# Patient Record
Sex: FEMALE | Race: BLACK OR AFRICAN AMERICAN | NOT HISPANIC OR LATINO | Employment: STUDENT | URBAN - METROPOLITAN AREA
[De-identification: names, ages, dates, MRNs, and addresses within clinical notes are randomized per-mention and may not be internally consistent; named-entity substitution may affect disease eponyms.]

---

## 2017-07-20 ENCOUNTER — LAB VISIT (OUTPATIENT)
Dept: LAB | Facility: OTHER | Age: 22
End: 2017-07-20
Attending: OBSTETRICS & GYNECOLOGY
Payer: MEDICAID

## 2017-07-20 ENCOUNTER — OFFICE VISIT (OUTPATIENT)
Dept: OBSTETRICS AND GYNECOLOGY | Facility: CLINIC | Age: 22
End: 2017-07-20
Attending: OBSTETRICS & GYNECOLOGY
Payer: MEDICAID

## 2017-07-20 VITALS
SYSTOLIC BLOOD PRESSURE: 114 MMHG | DIASTOLIC BLOOD PRESSURE: 66 MMHG | WEIGHT: 110.25 LBS | HEIGHT: 59 IN | BODY MASS INDEX: 22.23 KG/M2

## 2017-07-20 DIAGNOSIS — Z11.3 SCREEN FOR SEXUALLY TRANSMITTED DISEASES: ICD-10-CM

## 2017-07-20 DIAGNOSIS — Z23 NEED FOR HPV VACCINATION: Primary | ICD-10-CM

## 2017-07-20 DIAGNOSIS — Z01.419 ENCOUNTER FOR GYNECOLOGICAL EXAMINATION: Primary | ICD-10-CM

## 2017-07-20 DIAGNOSIS — Z23 NEED FOR HPV VACCINATION: ICD-10-CM

## 2017-07-20 DIAGNOSIS — L70.9 ACNE, UNSPECIFIED ACNE TYPE: ICD-10-CM

## 2017-07-20 LAB
C TRACH DNA SPEC QL NAA+PROBE: NOT DETECTED
CANDIDA RRNA VAG QL PROBE: NEGATIVE
G VAGINALIS RRNA GENITAL QL PROBE: POSITIVE
N GONORRHOEA DNA SPEC QL NAA+PROBE: NOT DETECTED
T VAGINALIS RRNA GENITAL QL PROBE: NEGATIVE

## 2017-07-20 PROCEDURE — 99385 PREV VISIT NEW AGE 18-39: CPT | Mod: S$PBB,,, | Performed by: OBSTETRICS & GYNECOLOGY

## 2017-07-20 PROCEDURE — 99999 PR PBB SHADOW E&M-EST. PATIENT-LVL I: CPT | Mod: PBBFAC,,,

## 2017-07-20 PROCEDURE — 86703 HIV-1/HIV-2 1 RESULT ANTBDY: CPT

## 2017-07-20 PROCEDURE — 36415 COLL VENOUS BLD VENIPUNCTURE: CPT

## 2017-07-20 PROCEDURE — 87340 HEPATITIS B SURFACE AG IA: CPT

## 2017-07-20 PROCEDURE — 99999 PR PBB SHADOW E&M-EST. PATIENT-LVL III: CPT | Mod: PBBFAC,,, | Performed by: OBSTETRICS & GYNECOLOGY

## 2017-07-20 PROCEDURE — 86592 SYPHILIS TEST NON-TREP QUAL: CPT

## 2017-07-20 RX ORDER — DROSPIRENONE AND ETHINYL ESTRADIOL 0.02-3(28)
1 KIT ORAL DAILY
Qty: 28 TABLET | Refills: 12 | Status: SHIPPED | OUTPATIENT
Start: 2017-07-20 | End: 2017-07-24 | Stop reason: SDUPTHER

## 2017-07-20 RX ORDER — DROSPIRENONE AND ETHINYL ESTRADIOL 0.02-3(28)
1 KIT ORAL DAILY
Qty: 28 TABLET | Refills: 12 | Status: SHIPPED | OUTPATIENT
Start: 2017-07-20 | End: 2017-07-20

## 2017-07-20 NOTE — PROGRESS NOTES
Subjective:       Patient ID: Paz Bentley is a 22 y.o. female.    Chief Complaint:  Establish Care (pt c/o spotting after her cycle.)      History of Present Illness.  Paz Bentley is a 22 y.o. female.  She has no breast or urinary symptoms.  She has no menorrhagia, oligomenorrhea, bleeding betweeen menses, postcoital bleeding, pelvic pain, dyspareunia, vaginal dryness, vaginal discharge, or sexual complaints.  Her periods usually come monthly and last 4-5 days. The flow is normally light to medium.  Her cramps are 6/10 first 2 days of bleeding and it is relieved by ibuprofen.  She was last sexually active in 2016.  She usually uses condoms.  She complains of bleeding 3 days after her last period.  She also complains of acne and wants to try OCP to help it.    GYN & OB History  Patient's last menstrual period was 07/15/2017 (approximate).   Gardasil: No    OB History    Para Term  AB Living   0 0 0 0 0 0   SAB TAB Ectopic Multiple Live Births   0 0 0 0 0             History reviewed. No pertinent past medical history.  History reviewed. No pertinent surgical history.  Family History   Problem Relation Age of Onset    Breast cancer Maternal Grandmother     Colon cancer Neg Hx     Ovarian cancer Neg Hx      Social History   Substance Use Topics    Smoking status: Never Smoker    Smokeless tobacco: Never Used    Alcohol use No       Current Outpatient Prescriptions:     drospirenone-ethinyl estradiol (JOSE) 3-0.02 mg per tablet, Take 1 tablet by mouth once daily., Disp: 28 tablet, Rfl: 12    Review of patient's allergies indicates:  No Known Allergies    Review of Systems  Review of Systems   Constitutional: Negative for fatigue.   HENT: Negative for trouble swallowing.    Eyes: Negative for visual disturbance.   Respiratory: Negative for cough and shortness of breath.    Cardiovascular: Negative for chest pain.   Gastrointestinal: Negative for abdominal distention, abdominal pain, blood  "in stool, nausea and vomiting.   Genitourinary: Negative for difficulty urinating, dyspareunia, dysuria, flank pain, frequency, hematuria, pelvic pain, urgency, vaginal bleeding, vaginal discharge and vaginal pain.   Musculoskeletal: Negative for arthralgias.   Skin: Negative for rash.   Neurological: Negative for dizziness and headaches.   Psychiatric/Behavioral: Negative for sleep disturbance. The patient is not nervous/anxious.         Objective:     Vitals:    07/20/17 1420   BP: 114/66   Weight: 50 kg (110 lb 3.7 oz)   Height: 4' 11" (1.499 m)   PainSc: 0-No pain     Body mass index is 22.26 kg/m².    Physical Exam:   Constitutional: She is oriented to person, place, and time. Vital signs are normal. She appears well-developed and well-nourished.    HENT:   Head: Normocephalic.     Neck: Normal range of motion. No thyromegaly present.     Pulmonary/Chest: Right breast exhibits no mass, no nipple discharge, no skin change, no tenderness and no swelling. Left breast exhibits no mass, no nipple discharge, no skin change, no tenderness and no swelling. Breasts are symmetrical.        Abdominal: Soft. Normal appearance and bowel sounds are normal. She exhibits no distension. There is no tenderness.     Genitourinary: Vagina normal and uterus normal. Pelvic exam was performed with patient supine. There is no rash, tenderness, lesion or injury on the right labia. There is no rash, tenderness, lesion or injury on the left labia. Cervix is normal. Right adnexum displays no mass, no tenderness and no fullness. Left adnexum displays no mass, no tenderness and no fullness. No erythema in the vagina. No vaginal discharge found. Cervix exhibits no motion tenderness and no discharge.           Musculoskeletal: Normal range of motion.      Lymphadenopathy:        Right: No inguinal and no supraclavicular adenopathy present.        Left: No inguinal and no supraclavicular adenopathy present.    Neurological: She is alert and " oriented to person, place, and time.    Skin: Skin is warm and dry.    Psychiatric: She has a normal mood and affect.        Assessment/ Plan:     Encounter for gynecological examination    Screen for sexually transmitted diseases  -     C. trachomatis/N. gonorrhoeae by AMP DNA Cervix  -     HIV-1 and HIV-2 antibodies; Future; Expected date: 07/20/2017  -     RPR; Future; Expected date: 07/20/2017  -     Hepatitis B surface antigen; Future; Expected date: 07/20/2017  -     Vaginosis Screen by DNA Probe    Need for HPV vaccination  -     (In Office Administered) HPV Vaccine (9-Valent) (3 Dose) (IM)    Acne, unspecified acne type  -     Discontinue: drospirenone-ethinyl estradiol (JOSE) 3-0.02 mg per tablet; Take 1 tablet by mouth once daily.  Dispense: 28 tablet; Refill: 12    Other orders  -     drospirenone-ethinyl estradiol (JOSE) 3-0.02 mg per tablet; Take 1 tablet by mouth once daily.  Dispense: 28 tablet; Refill: 12        Contraception and STD testing discussed. Patient will do STD testing.  Gardasil discussed.  Follow-up with me in 4 months.

## 2017-07-20 NOTE — PROGRESS NOTES
Educational flyer reviewed the HPV vaccination reviewed and provided to pt. Pt received HPV injection as ordered by Dr Ramirez to left deltoid, pt toelrated well. Pt denies pain. Pt instructed and observed for 15 min post injection. No reaction noted.

## 2017-07-21 LAB
HBV SURFACE AG SERPL QL IA: NEGATIVE
HIV 1+2 AB+HIV1 P24 AG SERPL QL IA: NEGATIVE
RPR SER QL: NORMAL

## 2017-07-22 DIAGNOSIS — B96.89 BV (BACTERIAL VAGINOSIS): Primary | ICD-10-CM

## 2017-07-22 DIAGNOSIS — N76.0 BV (BACTERIAL VAGINOSIS): Primary | ICD-10-CM

## 2017-07-22 RX ORDER — METRONIDAZOLE 500 MG/1
500 TABLET ORAL 2 TIMES DAILY
Qty: 14 TABLET | Refills: 0 | Status: SHIPPED | OUTPATIENT
Start: 2017-07-22 | End: 2017-07-24 | Stop reason: SDUPTHER

## 2017-07-24 ENCOUNTER — TELEPHONE (OUTPATIENT)
Dept: OBSTETRICS AND GYNECOLOGY | Facility: CLINIC | Age: 22
End: 2017-07-24

## 2017-07-24 DIAGNOSIS — N76.0 BV (BACTERIAL VAGINOSIS): ICD-10-CM

## 2017-07-24 DIAGNOSIS — B96.89 BV (BACTERIAL VAGINOSIS): ICD-10-CM

## 2017-07-24 RX ORDER — METRONIDAZOLE 500 MG/1
500 TABLET ORAL 2 TIMES DAILY
Qty: 14 TABLET | Refills: 0 | Status: SHIPPED | OUTPATIENT
Start: 2017-07-24 | End: 2018-08-21 | Stop reason: SDUPTHER

## 2017-07-24 RX ORDER — DROSPIRENONE AND ETHINYL ESTRADIOL 0.02-3(28)
1 KIT ORAL DAILY
Qty: 28 TABLET | Refills: 11 | Status: SHIPPED | OUTPATIENT
Start: 2017-07-24 | End: 2021-09-20

## 2017-07-24 NOTE — TELEPHONE ENCOUNTER
----- Message from Lucia Ramirez MD sent at 7/22/2017  9:58 AM CDT -----  Call pt.  STD labs normal

## 2017-07-24 NOTE — TELEPHONE ENCOUNTER
----- Message from Lucia Ramirez MD sent at 7/22/2017 10:33 AM CDT -----  Call patient.  Her vaginal swab is positive for bacterial vaginosis. This is not a sexually transmitted disease.  She doesn't have pharmacy, so please pend flagyl for me to sign.

## 2017-07-24 NOTE — TELEPHONE ENCOUNTER
Informed pt of results. Pt verbalized understanding and is requesting Rx to be sent to [facility] on [address] and [address]. Pharmacy and allergies are UTD please review and sign

## 2017-10-19 ENCOUNTER — CLINICAL SUPPORT (OUTPATIENT)
Dept: OBSTETRICS AND GYNECOLOGY | Facility: CLINIC | Age: 22
End: 2017-10-19
Payer: MEDICAID

## 2017-10-19 DIAGNOSIS — Z23 NEED FOR HPV VACCINE: Primary | ICD-10-CM

## 2017-10-19 PROCEDURE — 99999 PR PBB SHADOW E&M-EST. PATIENT-LVL I: CPT | Mod: PBBFAC,,,

## 2017-10-19 PROCEDURE — 99211 OFF/OP EST MAY X REQ PHY/QHP: CPT | Mod: PBBFAC,PN

## 2017-10-19 NOTE — PROGRESS NOTES
Ordering Physician: Dr. Ramirez    Order Type: Verbal     During visit today patient received injection of Gardasil to right deltoid. Patient tolerated well, no allergic reaction noted. Requested patient to remain 10 minutes after injection.     Pre-Pain Scale:None     Post Pain Scale:None

## 2018-06-11 ENCOUNTER — TELEPHONE (OUTPATIENT)
Dept: OBSTETRICS AND GYNECOLOGY | Facility: CLINIC | Age: 23
End: 2018-06-11

## 2018-06-11 NOTE — TELEPHONE ENCOUNTER
Swing pt - pt went to [facility] about 2 weeks ago for an ingrown hair in her vaginal area. She was prescribed an antibiotic and a cream but she lost the antibiotic so she couldn't finish taking it and the ingrown hair is back. Pt would like to know if she should see someone when she comes in on Thursday for her 3rd gardasil shot or if she can get an rx sent to the pharm for her.

## 2018-06-14 ENCOUNTER — CLINICAL SUPPORT (OUTPATIENT)
Dept: OBSTETRICS AND GYNECOLOGY | Facility: CLINIC | Age: 23
End: 2018-06-14
Payer: MEDICAID

## 2018-06-14 ENCOUNTER — OFFICE VISIT (OUTPATIENT)
Dept: OBSTETRICS AND GYNECOLOGY | Facility: CLINIC | Age: 23
End: 2018-06-14
Attending: STUDENT IN AN ORGANIZED HEALTH CARE EDUCATION/TRAINING PROGRAM
Payer: MEDICAID

## 2018-06-14 VITALS
HEIGHT: 59 IN | BODY MASS INDEX: 21.65 KG/M2 | DIASTOLIC BLOOD PRESSURE: 62 MMHG | WEIGHT: 107.38 LBS | SYSTOLIC BLOOD PRESSURE: 98 MMHG

## 2018-06-14 DIAGNOSIS — L73.9 FOLLICULITIS: Primary | ICD-10-CM

## 2018-06-14 DIAGNOSIS — Z23 NEED FOR HPV VACCINE: Primary | ICD-10-CM

## 2018-06-14 PROCEDURE — 90471 IMMUNIZATION ADMIN: CPT | Mod: PBBFAC

## 2018-06-14 PROCEDURE — 99999 PR PBB SHADOW E&M-EST. PATIENT-LVL I: CPT | Mod: PBBFAC,,,

## 2018-06-14 PROCEDURE — 99213 OFFICE O/P EST LOW 20 MIN: CPT | Mod: PBBFAC,27,25 | Performed by: STUDENT IN AN ORGANIZED HEALTH CARE EDUCATION/TRAINING PROGRAM

## 2018-06-14 PROCEDURE — 99211 OFF/OP EST MAY X REQ PHY/QHP: CPT | Mod: PBBFAC

## 2018-06-14 PROCEDURE — 99999 PR PBB SHADOW E&M-EST. PATIENT-LVL III: CPT | Mod: PBBFAC,,, | Performed by: STUDENT IN AN ORGANIZED HEALTH CARE EDUCATION/TRAINING PROGRAM

## 2018-06-14 PROCEDURE — 99213 OFFICE O/P EST LOW 20 MIN: CPT | Mod: S$PBB,,, | Performed by: STUDENT IN AN ORGANIZED HEALTH CARE EDUCATION/TRAINING PROGRAM

## 2018-06-14 NOTE — PROGRESS NOTES
"Chief Complaint: Ingrown hair     HPI:      Paz Bentley is a 23 y.o.  who presents today for Gardasil shot and evaluation of ingrown hair.  She reports that this is the 2nd time this has happened.  Reports it is like a pimple and will drain and then return.  She reports shaving in the area as well.  Denies any current drainage.  No fevers or chills.  Denies any vaginal discharge.      OB History      Para Term  AB Living    0 0 0 0 0 0    SAB TAB Ectopic Multiple Live Births    0 0 0 0 0        History reviewed. No pertinent past medical history.  History reviewed. No pertinent surgical history.  Social History     Social History    Marital status: Single     Spouse name: N/A    Number of children: N/A    Years of education: N/A     Occupational History    Not on file.     Social History Main Topics    Smoking status: Never Smoker    Smokeless tobacco: Never Used    Alcohol use No    Drug use: No    Sexual activity: Not Currently     Other Topics Concern    Not on file     Social History Narrative    No narrative on file     Family History   Problem Relation Age of Onset    Breast cancer Maternal Grandmother     Colon cancer Neg Hx     Ovarian cancer Neg Hx        ROS:     GENERAL: Denies unintentional weight gain or weight loss. Feeling well overall.   SKIN: Denies rash or lesions.   HEENT: Denies headaches, or vision changes.   CARDIOVASCULAR: Denies palpitations or chest pain.   RESPIRATORY: Denies shortness of breath or dyspnea on exertion.  BREASTS: Denies pain, lumps, or nipple discharge.   ABDOMEN: Denies abdominal pain, constipation, diarrhea, nausea, vomiting, change in appetite.  URINARY: Denies frequency, dysuria, hematuria.  NEUROLOGIC: Denies syncope or weakness.   PSYCHIATRIC: Denies depression, anxiety or mood swings.    Physical Exam:      PHYSICAL EXAM:  BP 98/62   Ht 4' 11" (1.499 m)   Wt 48.7 kg (107 lb 5.8 oz)   LMP 2018 (Approximate)   BMI 21.68 " kg/m²   Body mass index is 21.68 kg/m².     APPEARANCE: Well nourished, well developed, in no acute distress.  PSYCH: Appropriate mood and affect.  SKIN: No acne or hirsutism.  ABDOMEN: Soft.  No tenderness or masses.    PELVIC: Normal external genitalia without lesions.  Normal hair distribution.  4 x 4 mm area of folliculitis on L labia - healing well.  Adequate perineal body, normal urethral meatus.  Vagina moist and well rugated without lesions or discharge.  Cervix pink, without lesions, discharge or tenderness.  No significant cystocele or rectocele.  Bimanual exam shows uterus to be normal size, regular, mobile and nontender.  Adnexa without masses or tenderness.    EXTREMITIES: No edema.  No tenderness to palpation.    Assessment/Plan:     23 y.o.     Folliculitis          Counselin.  Folliculitis:  Well healed.  Counseled on perineal care and avoidance of triggers.  All questions answered.  Patient to call if symptoms worsen or do not improve.    2.  Follow up with PCP for other health maintenance.  3.  RTC in 1 month for well woman exam or sooner if needed.

## 2018-06-14 NOTE — PROGRESS NOTES
Ordering Physician: Dr. Tello    Verbal Order    Patient here to receive 3rd gardasil injection to left deltoid. Tolerated well, no reaction noted. Instructed to wait 15 minutes after administration.    Pre pain scale: none    Post pain scale: none

## 2018-08-21 ENCOUNTER — TELEPHONE (OUTPATIENT)
Dept: OBSTETRICS AND GYNECOLOGY | Facility: CLINIC | Age: 23
End: 2018-08-21

## 2018-08-21 DIAGNOSIS — N76.0 BV (BACTERIAL VAGINOSIS): ICD-10-CM

## 2018-08-21 DIAGNOSIS — B96.89 BV (BACTERIAL VAGINOSIS): ICD-10-CM

## 2018-08-21 RX ORDER — METRONIDAZOLE 500 MG/1
500 TABLET ORAL 2 TIMES DAILY
Qty: 14 TABLET | Refills: 0 | Status: SHIPPED | OUTPATIENT
Start: 2018-08-21 | End: 2019-06-07 | Stop reason: SDUPTHER

## 2018-08-21 NOTE — TELEPHONE ENCOUNTER
Pt c/o odor.  requesting Flagyl.  Advised no alcohol while on medication and if no improvement after taking medication she should be seen.      Flagyl pended

## 2018-08-21 NOTE — TELEPHONE ENCOUNTER
Swing pt - pt came in a few months ago and was diagnosed with BV and was prescribed flagyl. Pt said the symptoms are back and would like to see if she can get another rx sent into the pharm.  Richar - Raghav [phone]

## 2018-11-12 ENCOUNTER — TELEPHONE (OUTPATIENT)
Dept: OBSTETRICS AND GYNECOLOGY | Facility: CLINIC | Age: 23
End: 2018-11-12

## 2018-11-12 RX ORDER — METRONIDAZOLE 500 MG/1
500 TABLET ORAL 2 TIMES DAILY
Qty: 14 TABLET | Refills: 0 | Status: SHIPPED | OUTPATIENT
Start: 2018-11-12 | End: 2018-11-19

## 2018-11-12 NOTE — TELEPHONE ENCOUNTER
Swing pt - pt thinks she has BV and would like to see if she can get something sent into the pharm.   Richar - Raghav [phone]

## 2018-11-12 NOTE — TELEPHONE ENCOUNTER
Pt thinks she has BV.  C/o discharge and odor.  Requesting Flagyl.  She has an appt next week so if she is not better she will let you know.  Aware no alcohol while on medication    Flagyl pended

## 2018-11-20 ENCOUNTER — OFFICE VISIT (OUTPATIENT)
Dept: OBSTETRICS AND GYNECOLOGY | Facility: CLINIC | Age: 23
End: 2018-11-20
Attending: STUDENT IN AN ORGANIZED HEALTH CARE EDUCATION/TRAINING PROGRAM
Payer: MEDICAID

## 2018-11-20 VITALS — BODY MASS INDEX: 22 KG/M2 | WEIGHT: 108.94 LBS | SYSTOLIC BLOOD PRESSURE: 100 MMHG | DIASTOLIC BLOOD PRESSURE: 68 MMHG

## 2018-11-20 DIAGNOSIS — Z01.419 WELL WOMAN EXAM: ICD-10-CM

## 2018-11-20 DIAGNOSIS — N89.8 VAGINAL DISCHARGE: ICD-10-CM

## 2018-11-20 DIAGNOSIS — Z12.4 ENCOUNTER FOR PAPANICOLAOU SMEAR FOR CERVICAL CANCER SCREENING: Primary | ICD-10-CM

## 2018-11-20 LAB
CANDIDA RRNA VAG QL PROBE: NEGATIVE
G VAGINALIS RRNA GENITAL QL PROBE: NEGATIVE
T VAGINALIS RRNA GENITAL QL PROBE: NEGATIVE

## 2018-11-20 PROCEDURE — 87491 CHLMYD TRACH DNA AMP PROBE: CPT

## 2018-11-20 PROCEDURE — 88175 CYTOPATH C/V AUTO FLUID REDO: CPT

## 2018-11-20 PROCEDURE — 87660 TRICHOMONAS VAGIN DIR PROBE: CPT

## 2018-11-20 PROCEDURE — 99395 PREV VISIT EST AGE 18-39: CPT | Mod: S$PBB,,, | Performed by: STUDENT IN AN ORGANIZED HEALTH CARE EDUCATION/TRAINING PROGRAM

## 2018-11-20 PROCEDURE — 99213 OFFICE O/P EST LOW 20 MIN: CPT | Mod: PBBFAC | Performed by: STUDENT IN AN ORGANIZED HEALTH CARE EDUCATION/TRAINING PROGRAM

## 2018-11-20 PROCEDURE — 99999 PR PBB SHADOW E&M-EST. PATIENT-LVL III: CPT | Mod: PBBFAC,,, | Performed by: STUDENT IN AN ORGANIZED HEALTH CARE EDUCATION/TRAINING PROGRAM

## 2018-11-20 NOTE — PROGRESS NOTES
Chief Complaint: Well Woman Exam     HPI:      Paz Bentley is a 23 y.o.  who presents today for well woman exam.  LMP: Patient's last menstrual period was 2018.  She does report some recurrent bacterial vaginosis - just completed flagyl.  No other issues, problems, or complaints. Specifically, patient denies abnormal vaginal bleeding, discharge, pelvic pain, urinary problems, or changes in appetite. Ms. Bentley is currently sexually active with a single male partner. She is currently using condoms for contraception. She would like STD screening today.    Previous Pap: No result found     OB History      Para Term  AB Living    0 0 0 0 0 0    SAB TAB Ectopic Multiple Live Births    0 0 0 0 0        History reviewed. No pertinent past medical history.  History reviewed. No pertinent surgical history.  Social History     Socioeconomic History    Marital status: Single     Spouse name: Not on file    Number of children: Not on file    Years of education: Not on file    Highest education level: Not on file   Social Needs    Financial resource strain: Not on file    Food insecurity - worry: Not on file    Food insecurity - inability: Not on file    Transportation needs - medical: Not on file    Transportation needs - non-medical: Not on file   Occupational History    Not on file   Tobacco Use    Smoking status: Never Smoker    Smokeless tobacco: Never Used   Substance and Sexual Activity    Alcohol use: No    Drug use: No    Sexual activity: Not Currently   Other Topics Concern    Not on file   Social History Narrative    Not on file     Family History   Problem Relation Age of Onset    Breast cancer Maternal Grandmother     Colon cancer Neg Hx     Ovarian cancer Neg Hx        Current Outpatient Medications:     drospirenone-ethinyl estradiol (JOSE) 3-0.02 mg per tablet, Take 1 tablet by mouth once daily., Disp: 28 tablet, Rfl: 11    metroNIDAZOLE (FLAGYL) 500 MG tablet, Take  1 tablet (500 mg total) by mouth 2 (two) times daily., Disp: 14 tablet, Rfl: 0    ROS:     GENERAL: Denies unintentional weight gain or weight loss. Feeling well overall.   SKIN: Denies rash or lesions.   HEENT: Denies headaches, or vision changes.   CARDIOVASCULAR: Denies palpitations or chest pain.   RESPIRATORY: Denies shortness of breath or dyspnea on exertion.  BREASTS: Denies pain, lumps, or nipple discharge.   ABDOMEN: Denies abdominal pain, constipation, diarrhea, nausea, vomiting, change in appetite.  URINARY: Denies frequency, dysuria, hematuria.  NEUROLOGIC: Denies syncope or weakness.   PSYCHIATRIC: Denies depression, anxiety or mood swings.    Physical Exam:      PHYSICAL EXAM:  /68   Wt 49.4 kg (108 lb 14.5 oz)   LMP 2018   BMI 22.00 kg/m²   Body mass index is 22 kg/m².     APPEARANCE: Well nourished, well developed, in no acute distress.  PSYCH: Appropriate mood and affect.  SKIN: No acne or hirsutism.  NECK: Neck symmetric without masses or thyromegaly.  NODES: No inguinal, axillary, or supraclavicular lymph node enlargement.  CHEST: Normal respiratory effort.    CARDIOVASCULAR:  Regular rate and rhythm.  LUNGS:  Clear to auscultation bilaterally.  BREASTS: Symmetrical, no skin changes or visible lesions.  No palpable masses or nipple discharge bilaterally.  ABDOMEN: Soft.  No tenderness or masses.    PELVIC: Normal external genitalia without lesions.  Normal hair distribution.  Adequate perineal body, normal urethral meatus.  Vagina moist and well rugated without lesions or discharge.  Cervix pink, without lesions, discharge or tenderness.  No significant cystocele or rectocele.  Bimanual exam shows uterus to be normal size, regular, mobile and nontender.  Adnexa without masses or tenderness.    EXTREMITIES: No edema.  No tenderness to palpation.    Assessment/Plan:     23 y.o.     Encounter for Papanicolaou smear for cervical cancer screening  -     Liquid-based pap smear,  screening  -     Vaginosis Screen by DNA Probe    Vaginal discharge  -     C. trachomatis/N. gonorrhoeae by AMP DNA          Counselin.  Annual exam performed today without difficulty.  Patient was counseled today on current ASCCP pap guidelines, the recommendation for yearly pelvic exams, healthy diet and exercise routines, breast self awareness.  Pap smear collected.  All questions answered.    2.  Contraception:  The risks of, benefits of, and alternatives of various forms of contraception were discussed at this visit. After a discussion of the R/B/A of fertility awareness, barrier contraception, hormonal pills, injections, patches, rings, hormonal and non-hormonal IUDs, and the subdermal implant, all of  questions were answered, and she has opted for condom use.  3.  Vulvar and perineal care discussed:  Vaginal swab collected.    4.  STD testing:  GC/CT collected.  Declines blood work.  5.  Follow up with PCP for other health maintenance.  6.  RTC in 1 year or sooner if needed.    Use of the [facility] Patient Portal discussed and encouraged during today's visit.

## 2018-11-21 ENCOUNTER — TELEPHONE (OUTPATIENT)
Dept: OBSTETRICS AND GYNECOLOGY | Facility: CLINIC | Age: 23
End: 2018-11-21

## 2018-11-21 PROBLEM — Z01.419 WELL WOMAN EXAM: Status: ACTIVE | Noted: 2018-11-21

## 2018-11-21 LAB
C TRACH DNA SPEC QL NAA+PROBE: NOT DETECTED
N GONORRHOEA DNA SPEC QL NAA+PROBE: NOT DETECTED

## 2018-11-21 NOTE — TELEPHONE ENCOUNTER
----- Message from Maia Tello MD sent at 11/21/2018  1:34 PM CST -----  Please call patient and let her know that her vaginal swabs are negative which is great news!  We are still waiting on her pap smear but will let her know when it is back.  Thanks!

## 2018-11-30 ENCOUNTER — TELEPHONE (OUTPATIENT)
Dept: OBSTETRICS AND GYNECOLOGY | Facility: CLINIC | Age: 23
End: 2018-11-30

## 2018-11-30 NOTE — TELEPHONE ENCOUNTER
----- Message from Maia Tello MD sent at 11/29/2018  5:45 PM CST -----  Please call patient:      Your pap smear is back and everything looks completely normal. This is great news! Based on current guidelines you don't need another pap smear for 3 years. We do still recommend that you come back to clinic each year for your annual exam. This gives us a chance to make sure you're doing well and no problems have developed since your last visit.  Please let me know if you have any questions or concerns.      Thanks!!

## 2019-04-08 NOTE — TELEPHONE ENCOUNTER
Pt notified rx has been sent to pharmacy.   What Type Of Note Output Would You Prefer (Optional)?: Standard Output How Severe Is Your Skin Lesion?: mild Has Your Skin Lesion Been Treated?: not been treated Is This A New Presentation, Or A Follow-Up?: Skin Lesions

## 2019-06-07 ENCOUNTER — TELEPHONE (OUTPATIENT)
Dept: OBSTETRICS AND GYNECOLOGY | Facility: CLINIC | Age: 24
End: 2019-06-07

## 2019-06-07 DIAGNOSIS — N76.0 BV (BACTERIAL VAGINOSIS): ICD-10-CM

## 2019-06-07 DIAGNOSIS — B96.89 BV (BACTERIAL VAGINOSIS): ICD-10-CM

## 2019-06-07 RX ORDER — METRONIDAZOLE 500 MG/1
500 TABLET ORAL 2 TIMES DAILY
Qty: 14 TABLET | Refills: 0 | Status: SHIPPED | OUTPATIENT
Start: 2019-06-07 | End: 2019-10-31 | Stop reason: SDUPTHER

## 2019-06-07 NOTE — TELEPHONE ENCOUNTER
Arcadio Yost pt- pt thinks she is has BV again. Reports odor and discharge. Denies redness or irritation. Will come in if not better.     Allergies and Pharm UTD  Flagyl pended

## 2019-06-07 NOTE — TELEPHONE ENCOUNTER
Maia eTllo MD  You 13 minutes ago (4:29 PM)      Sent thanks!  Please let patient know and please remind her no EtOH while on flagyl.  Thanks!    Routing comment      Pt notified that Rx has been sent to pharm. Reminded not to drink alcohol while taking this medication and to finish every pill.

## 2019-06-07 NOTE — TELEPHONE ENCOUNTER
Christie pt - pt thinks she has BV and would like to see if she can get an rx sent into the pharm.

## 2019-07-15 ENCOUNTER — TELEPHONE (OUTPATIENT)
Dept: OBSTETRICS AND GYNECOLOGY | Facility: CLINIC | Age: 24
End: 2019-07-15

## 2019-07-15 RX ORDER — METRONIDAZOLE 500 MG/1
500 TABLET ORAL 2 TIMES DAILY
Qty: 14 TABLET | Refills: 0 | Status: SHIPPED | OUTPATIENT
Start: 2019-07-15 | End: 2019-07-22

## 2019-07-15 NOTE — TELEPHONE ENCOUNTER
Christie pt, didn't finish her medication that we recently prescribed. Pt still having vaginal discharge and odor

## 2019-07-15 NOTE — TELEPHONE ENCOUNTER
Pt only took a couple days of Flagyl but didn't finish rx.  Still having symptoms of BV.      Flagyl pended    Call got dropped several times.

## 2019-08-02 ENCOUNTER — OFFICE VISIT (OUTPATIENT)
Dept: URGENT CARE | Facility: CLINIC | Age: 24
End: 2019-08-02
Payer: MEDICAID

## 2019-08-02 VITALS
DIASTOLIC BLOOD PRESSURE: 72 MMHG | SYSTOLIC BLOOD PRESSURE: 113 MMHG | RESPIRATION RATE: 20 BRPM | TEMPERATURE: 98 F | BODY MASS INDEX: 21.77 KG/M2 | WEIGHT: 108 LBS | OXYGEN SATURATION: 100 % | HEIGHT: 59 IN | HEART RATE: 73 BPM

## 2019-08-02 DIAGNOSIS — J02.9 ACUTE PHARYNGITIS, UNSPECIFIED ETIOLOGY: Primary | ICD-10-CM

## 2019-08-02 PROCEDURE — 99203 OFFICE O/P NEW LOW 30 MIN: CPT | Mod: S$GLB,,, | Performed by: FAMILY MEDICINE

## 2019-08-02 PROCEDURE — 99203 PR OFFICE/OUTPT VISIT, NEW, LEVL III, 30-44 MIN: ICD-10-PCS | Mod: S$GLB,,, | Performed by: FAMILY MEDICINE

## 2019-08-02 RX ORDER — DEXAMETHASONE SODIUM PHOSPHATE 100 MG/10ML
5 INJECTION INTRAMUSCULAR; INTRAVENOUS
Status: COMPLETED | OUTPATIENT
Start: 2019-08-02 | End: 2019-08-02

## 2019-08-02 RX ORDER — CEFDINIR 300 MG/1
300 CAPSULE ORAL 2 TIMES DAILY
Qty: 20 CAPSULE | Refills: 0 | Status: SHIPPED | OUTPATIENT
Start: 2019-08-02 | End: 2019-08-12

## 2019-08-02 RX ADMIN — DEXAMETHASONE SODIUM PHOSPHATE 5 MG: 100 INJECTION INTRAMUSCULAR; INTRAVENOUS at 11:08

## 2019-08-02 NOTE — PATIENT INSTRUCTIONS
Please drink plenty of fluids.  Please get plenty of rest.  Please return here or go to the Emergency Department for any concerns or worsening of condition.  If you were given wait & see antibiotics, please wait 3-5 days before taking them, and only take them if your symptoms have worsened or not improved.  If you do begin taking the antibiotics, please take them to completion.  If you were prescribed antibiotics, please take them to completion.    If you were prescribed a narcotic medication, do not drive or operate heavy equipment or machinery while taking these medications.    You were given a decongestant (RESCON or POLY VENT Dm).  If your insurance does not cover it or you cannot afford it, it is ok to use the over the counter products listed below.  If you do not have Hypertension or any history of palpitations, it is ok to take over the counter Sudafed or Mucinex D or Allegra-D or Claritin-D or Zyrtec-D.  If you do take one of the above, it is ok to combine that with plain over the counter Mucinex or Allegra or Claritin or Zyrtec.  If for example you are taking Zyrtec -D, you can combine that with Mucinex, but not Mucinex-D.  If you are taking Mucinex-D, you can combine that with plain Allegra or Claritin or Zyrtec.   If you do have Hypertension or palpitations, it is safe to take Coricidin HBP for relief of sinus symptoms.    We recommend you take over the counter Flonase (Fluticasone) or another nasally inhaled steroid unless you are already taking one.      Nasal irrigation with a saline spray or Netti Pot like device per their directions is also recommended.  If not allergic, please take over the counter Tylenol (Acetaminophen)  as directed for control of pain and/or fever.  You may take Sudafed every 6 hours as directed for congestion.    You were given an injection of steroid.  This will relieve swelling and inflammation and improve respiratory symptoms.  It can raise your blood sugar if you are  diabetic.    Please follow up with your primary care doctor or specialist as needed.    Primary Doctor No  None    If you  smoke, please stop smoking.       You must understand that you have received treatment at an Urgent Care facility only, and that you may be  released before all of your medical problems are known or treated. Urgent Care facilities are not equipped to  handle life threatening emergencies. It is recommended that you seek care at an Emergency Department for  further evaluation of worsening or concerning symptoms, or possibly life threatening conditions as  discussed.    Pharyngitis: Strep (Presumed)    You have pharyngitis (sore throat). The cause is thought to be the streptococcus, or strep, bacterium. Strep throat infection can cause throat pain that is worse when swallowing, aching all over, headache, and fever. The infection may be spread by coughing, kissing, or touching others after touching your mouth or nose. Antibiotic medications are given to treat the infection.  Home care  · Rest at home. Drink plenty of fluids to avoid dehydration.  · No work or school for the first 2 days of taking the antibiotics. After this time, you will not be contagious. You can then return to work or school if you are feeling better.   · The antibiotic medication must be taken for the full 10 days, even if you feel better. This is very important to ensure the infection is treated. It is also important to prevent drug-resistant organisms from developing. If you were given an antibiotic shot, no more antibiotics are needed.  · You may use acetaminophen or ibuprofen to control pain or fever, unless another medicine was prescribed for this. If you have chronic liver or kidney disease or ever had a stomach ulcer or GI bleeding, talk with your doctor before using these medicines.  · Throat lozenges or a throat-numbing sprays can help reduce throat pain. Gargling with warm salt water can also help. Dissolve 1/2  teaspoon of salt in 1 8 ounce glass of warm water.   · Avoid salty or spicy foods, which can irritate the throat.  Follow-up care  Follow up with your healthcare provider or our staff if you are not improving over the next week.  When to seek medical advice  Call your healthcare provider right away if any of these occur:  · Fever as directed by your doctor.   · New or worsening ear pain, sinus pain, or headache  · Painful lumps in the back of neck  · Stiff neck  · Lymph nodes are getting larger  · Inability to swallow liquids, excessive drooling, or inability to open mouth wide due to throat pain  · Signs of dehydration (very dark urine or no urine, sunken eyes, dizziness)  · Trouble breathing or noisy breathing  · Muffled voice  · New rash  Date Last Reviewed: 4/13/2015  © 0962-2723 [employer]. [address] [place] [address], [place] [address]. All rights reserved. This information is not intended as a substitute for professional medical care. Always follow your healthcare professional's instructions.

## 2019-08-02 NOTE — LETTER
August 2, 2019  Paz Bentley  [address]   [place] [id number]                [facility] [place]  [address], [address]  [place] [address]  Phone: [phone]  Fax: [phone] Paz Bentley was seen and treated in our Urgent Care department   on 8/2/2019. She may return to work in 2 - 3 days.      If you have any questions or concerns, please don't hesitate to call.    Sincerely,        Nael Portillo MD

## 2019-08-02 NOTE — PROGRESS NOTES
"Subjective:       Patient ID: Paz Bentley is a 24 y.o. female.    Vitals:  height is 4' 11" (1.499 m) and weight is 49 kg (108 lb). Her tympanic temperature is 97.6 °F (36.4 °C). Her blood pressure is 113/72 and her pulse is 73. Her respiration is 20 and oxygen saturation is 100%.     Chief Complaint: Fever    Fever    This is a new problem. The current episode started in the past 7 days. The problem occurs constantly. The problem has been gradually worsening. The maximum temperature noted was 103 to 103.9 F. The temperature was taken using an oral thermometer. Associated symptoms include ear pain and headaches. Pertinent negatives include no chest pain, congestion, coughing, diarrhea, nausea, rash, sore throat, urinary pain or vomiting. She has tried acetaminophen (nyquil) for the symptoms. The treatment provided mild relief.       Constitution: Positive for appetite change, chills, sweating, fatigue and fever.   HENT: Positive for ear pain. Negative for congestion and sore throat.    Neck: Negative for painful lymph nodes.   Cardiovascular: Negative for chest pain and leg swelling.   Eyes: Negative for double vision and blurred vision.   Respiratory: Negative for cough and shortness of breath.    Gastrointestinal: Negative for nausea, vomiting and diarrhea.   Genitourinary: Negative for dysuria, frequency, urgency and history of kidney stones.   Musculoskeletal: Negative for joint pain, joint swelling, muscle cramps and muscle ache.   Skin: Negative for color change, pale, rash and bruising.   Allergic/Immunologic: Negative for seasonal allergies.   Neurological: Positive for headaches. Negative for dizziness, history of vertigo, light-headedness and passing out.   Hematologic/Lymphatic: Negative for swollen lymph nodes.   Psychiatric/Behavioral: Negative for nervous/anxious, sleep disturbance and depression. The patient is not nervous/anxious.        Objective:      Physical Exam   Constitutional: She is " oriented to person, place, and time. She appears well-developed and well-nourished. She is cooperative.  Non-toxic appearance. She does not appear ill. No distress.   HENT:   Head: Normocephalic and atraumatic.   Right Ear: Hearing, tympanic membrane, external ear and ear canal normal.   Left Ear: Hearing, tympanic membrane, external ear and ear canal normal.   Nose: Nose normal. No mucosal edema, rhinorrhea or nasal deformity. No epistaxis. Right sinus exhibits no maxillary sinus tenderness and no frontal sinus tenderness. Left sinus exhibits no maxillary sinus tenderness and no frontal sinus tenderness.   Mouth/Throat: Uvula is midline and mucous membranes are normal. No trismus in the jaw. Normal dentition. No uvula swelling. Posterior oropharyngeal edema and posterior oropharyngeal erythema present.   Eyes: Conjunctivae and lids are normal. No scleral icterus.   Neck: Trachea normal, full passive range of motion without pain and phonation normal. Neck supple.   Cardiovascular: Normal rate, regular rhythm, normal heart sounds, intact distal pulses and normal pulses.   Pulmonary/Chest: Effort normal and breath sounds normal. No respiratory distress.   Abdominal: Soft. Normal appearance and bowel sounds are normal. She exhibits no distension. There is no tenderness.   Musculoskeletal: Normal range of motion. She exhibits no edema or deformity.   Neurological: She is alert and oriented to person, place, and time. She exhibits normal muscle tone. Coordination normal.   Skin: Skin is warm, dry and intact. She is not diaphoretic. No pallor.   Psychiatric: She has a normal mood and affect. Her speech is normal and behavior is normal. Judgment and thought content normal. Cognition and memory are normal.   Nursing note and vitals reviewed.      Assessment:       1. Acute pharyngitis, unspecified etiology        Plan:         Acute pharyngitis, unspecified etiology  -     cefdinir (OMNICEF) 300 MG capsule; Take 1 capsule  (300 mg total) by mouth 2 (two) times daily. for 10 days  Dispense: 20 capsule; Refill: 0  -     pseudoephedrine-DM-guaifenesin (POLY-VENT DM) 60- mg Tab; Take 1 tablet by mouth every 6 (six) hours as needed.  Dispense: 20 tablet; Refill: 0  -     dexamethasone injection 5 mg    Please drink plenty of fluids.  Please get plenty of rest.  Please return here or go to the Emergency Department for any concerns or worsening of condition.  If you were given wait & see antibiotics, please wait 3-5 days before taking them, and only take them if your symptoms have worsened or not improved.  If you do begin taking the antibiotics, please take them to completion.  If you were prescribed antibiotics, please take them to completion.  If you were prescribed a narcotic medication, do not drive or operate heavy equipment or machinery while taking these medications.    You were given a decongestant (RESCON or POLY VENT Dm).  If your insurance does not cover it or you cannot afford it, it is ok to use the over the counter products listed below.  If you do not have Hypertension or any history of palpitations, it is ok to take over the counter Sudafed or Mucinex D or Allegra-D or Claritin-D or Zyrtec-D.  If you do take one of the above, it is ok to combine that with plain over the counter Mucinex or Allegra or Claritin or Zyrtec.  If for example you are taking Zyrtec -D, you can combine that with Mucinex, but not Mucinex-D.  If you are taking Mucinex-D, you can combine that with plain Allegra or Claritin or Zyrtec.   If you do have Hypertension or palpitations, it is safe to take Coricidin HBP for relief of sinus symptoms.    We recommend you take over the counter Flonase (Fluticasone) or another nasally inhaled steroid unless you are already taking one.  Nasal irrigation with a saline spray or Netti Pot like device per their directions is also recommended.  If not allergic, please take over the counter Tylenol (Acetaminophen)  and/or Motrin (Ibuprofen) as directed for control of pain and/or fever.    Robitussin DM 2 teas every 4 hours as needed for cough.  If you  smoke, please stop smoking.    You were given an injection of steroid.  This will relieve swelling and inflammation and improve respiratory symptoms.  It can raise your blood sugar if you are diabetic.    Please follow up with your primary care doctor or specialist as needed.  Primary Doctor No  None    You must understand that you have received treatment at an Urgent Care facility only, and that you may be  released before all of your medical problems are known or treated. Urgent Care facilities are not equipped to  handle life threatening emergencies. It is recommended that you seek care at an Emergency Department for  further evaluation of worsening or concerning symptoms, or possibly life threatening conditions as  discussed.

## 2019-08-05 ENCOUNTER — TELEPHONE (OUTPATIENT)
Dept: URGENT CARE | Facility: CLINIC | Age: 24
End: 2019-08-05

## 2019-09-12 ENCOUNTER — TELEPHONE (OUTPATIENT)
Dept: OBSTETRICS AND GYNECOLOGY | Facility: CLINIC | Age: 24
End: 2019-09-12

## 2019-09-12 RX ORDER — METRONIDAZOLE 500 MG/1
500 TABLET ORAL 2 TIMES DAILY
Qty: 14 TABLET | Refills: 0 | Status: SHIPPED | OUTPATIENT
Start: 2019-09-12 | End: 2019-09-19

## 2019-09-12 NOTE — TELEPHONE ENCOUNTER
Christie pt - pt would like to see if she can get an rx sent into the pharm for BV.  Rx - [facility] [phone]

## 2019-09-12 NOTE — TELEPHONE ENCOUNTER
Pt thinks she has BV.  C/o odor.  Requesting a rx.  Recommended an appt if no improvement after taking medication.      Flagyl pended

## 2019-10-30 DIAGNOSIS — N76.0 BV (BACTERIAL VAGINOSIS): ICD-10-CM

## 2019-10-30 DIAGNOSIS — B96.89 BV (BACTERIAL VAGINOSIS): ICD-10-CM

## 2019-10-30 RX ORDER — METRONIDAZOLE 500 MG/1
500 TABLET ORAL 2 TIMES DAILY
Qty: 14 TABLET | Refills: 0 | OUTPATIENT
Start: 2019-10-30

## 2019-10-31 ENCOUNTER — TELEPHONE (OUTPATIENT)
Dept: OBSTETRICS AND GYNECOLOGY | Facility: CLINIC | Age: 24
End: 2019-10-31

## 2019-10-31 DIAGNOSIS — N76.0 BV (BACTERIAL VAGINOSIS): ICD-10-CM

## 2019-10-31 DIAGNOSIS — B96.89 BV (BACTERIAL VAGINOSIS): ICD-10-CM

## 2019-10-31 RX ORDER — METRONIDAZOLE 500 MG/1
500 TABLET ORAL 2 TIMES DAILY
Qty: 14 TABLET | Refills: 0 | Status: SHIPPED | OUTPATIENT
Start: 2019-10-31 | End: 2020-06-01 | Stop reason: SDUPTHER

## 2019-10-31 NOTE — TELEPHONE ENCOUNTER
Christie pt, called and spoke to someone yesterday requesting refill of medication for b.v. But the request was denied. Pt having odor and discharge.

## 2019-10-31 NOTE — TELEPHONE ENCOUNTER
Pt thinks she has BV and would like PO medication.  Aware of alcohol restrictions.  Recommended an appt if no improvement after taking medication.

## 2019-11-26 ENCOUNTER — LAB VISIT (OUTPATIENT)
Dept: LAB | Facility: OTHER | Age: 24
End: 2019-11-26
Attending: STUDENT IN AN ORGANIZED HEALTH CARE EDUCATION/TRAINING PROGRAM
Payer: MEDICAID

## 2019-11-26 ENCOUNTER — OFFICE VISIT (OUTPATIENT)
Dept: OBSTETRICS AND GYNECOLOGY | Facility: CLINIC | Age: 24
End: 2019-11-26
Attending: STUDENT IN AN ORGANIZED HEALTH CARE EDUCATION/TRAINING PROGRAM
Payer: MEDICAID

## 2019-11-26 VITALS — WEIGHT: 116.88 LBS | BODY MASS INDEX: 23.56 KG/M2 | HEIGHT: 59 IN

## 2019-11-26 DIAGNOSIS — Z11.3 SCREENING EXAMINATION FOR STD (SEXUALLY TRANSMITTED DISEASE): Primary | ICD-10-CM

## 2019-11-26 DIAGNOSIS — Z11.3 SCREENING EXAMINATION FOR STD (SEXUALLY TRANSMITTED DISEASE): ICD-10-CM

## 2019-11-26 DIAGNOSIS — Z01.419 WELL WOMAN EXAM: ICD-10-CM

## 2019-11-26 LAB
C TRACH DNA SPEC QL NAA+PROBE: NOT DETECTED
N GONORRHOEA DNA SPEC QL NAA+PROBE: NOT DETECTED

## 2019-11-26 PROCEDURE — 99213 OFFICE O/P EST LOW 20 MIN: CPT | Mod: PBBFAC | Performed by: STUDENT IN AN ORGANIZED HEALTH CARE EDUCATION/TRAINING PROGRAM

## 2019-11-26 PROCEDURE — 99395 PR PREVENTIVE VISIT,EST,18-39: ICD-10-PCS | Mod: S$PBB,,, | Performed by: STUDENT IN AN ORGANIZED HEALTH CARE EDUCATION/TRAINING PROGRAM

## 2019-11-26 PROCEDURE — 99999 PR PBB SHADOW E&M-EST. PATIENT-LVL III: ICD-10-PCS | Mod: PBBFAC,,, | Performed by: STUDENT IN AN ORGANIZED HEALTH CARE EDUCATION/TRAINING PROGRAM

## 2019-11-26 PROCEDURE — 36415 COLL VENOUS BLD VENIPUNCTURE: CPT

## 2019-11-26 PROCEDURE — 87491 CHLMYD TRACH DNA AMP PROBE: CPT

## 2019-11-26 PROCEDURE — 99395 PREV VISIT EST AGE 18-39: CPT | Mod: S$PBB,,, | Performed by: STUDENT IN AN ORGANIZED HEALTH CARE EDUCATION/TRAINING PROGRAM

## 2019-11-26 PROCEDURE — 86592 SYPHILIS TEST NON-TREP QUAL: CPT

## 2019-11-26 PROCEDURE — 87340 HEPATITIS B SURFACE AG IA: CPT

## 2019-11-26 PROCEDURE — 86703 HIV-1/HIV-2 1 RESULT ANTBDY: CPT

## 2019-11-26 PROCEDURE — 99999 PR PBB SHADOW E&M-EST. PATIENT-LVL III: CPT | Mod: PBBFAC,,, | Performed by: STUDENT IN AN ORGANIZED HEALTH CARE EDUCATION/TRAINING PROGRAM

## 2019-11-27 NOTE — PROGRESS NOTES
Chief Complaint: Well Woman Exam     HPI:      Paz Bentley is a 24 y.o.  who presents today for well woman exam.  LMP: Patient's last menstrual period was 2019 (exact date).  No issues, problems, or complaints. Specifically, patient denies abnormal vaginal bleeding, discharge, pelvic pain, urinary problems, or changes in appetite. Ms. Bentley is currently sexually active with a single male partner. She is currently using no method for contraception. She declines STD screening today.    Previous Pap: 2018 no abnormalities    OB History        0    Para   0    Term   0       0    AB   0    Living   0       SAB   0    TAB   0    Ectopic   0    Multiple   0    Live Births   0           Obstetric Comments   Menarche at 14           History reviewed. No pertinent past medical history.  History reviewed. No pertinent surgical history.  Social History     Socioeconomic History    Marital status: Single     Spouse name: Not on file    Number of children: Not on file    Years of education: Not on file    Highest education level: Not on file   Occupational History    Not on file   Social Needs    Financial resource strain: Not on file    Food insecurity:     Worry: Not on file     Inability: Not on file    Transportation needs:     Medical: Not on file     Non-medical: Not on file   Tobacco Use    Smoking status: Never Smoker    Smokeless tobacco: Never Used   Substance and Sexual Activity    Alcohol use: Yes     Comment: rare    Drug use: No    Sexual activity: Yes     Partners: Male     Birth control/protection: None   Lifestyle    Physical activity:     Days per week: Not on file     Minutes per session: Not on file    Stress: Not on file   Relationships    Social connections:     Talks on phone: Not on file     Gets together: Not on file     Attends Baptism service: Not on file     Active member of club or organization: Not on file     Attends meetings of clubs or  "organizations: Not on file     Relationship status: Not on file   Other Topics Concern    Not on file   Social History Narrative    Not on file     Family History   Problem Relation Age of Onset    Breast cancer Maternal Grandmother     Colon cancer Neg Hx     Ovarian cancer Neg Hx        Current Outpatient Medications:     drospirenone-ethinyl estradiol (JOSE) 3-0.02 mg per tablet, Take 1 tablet by mouth once daily., Disp: 28 tablet, Rfl: 11    metroNIDAZOLE (FLAGYL) 500 MG tablet, Take 1 tablet (500 mg total) by mouth 2 (two) times daily., Disp: 14 tablet, Rfl: 0    pseudoephedrine-DM-guaifenesin (POLY-VENT DM) 60- mg Tab, Take 1 tablet by mouth every 6 (six) hours as needed., Disp: 20 tablet, Rfl: 0    ROS:     GENERAL: Denies unintentional weight gain or weight loss. Feeling well overall.   SKIN: Denies rash or lesions.   HEENT: Denies headaches, or vision changes.   CARDIOVASCULAR: Denies palpitations or chest pain.   RESPIRATORY: Denies shortness of breath or dyspnea on exertion.  BREASTS: Denies pain, lumps, or nipple discharge.   ABDOMEN: Denies abdominal pain, constipation, diarrhea, nausea, vomiting, change in appetite.  URINARY: Denies frequency, dysuria, hematuria.  NEUROLOGIC: Denies syncope or weakness.   PSYCHIATRIC: Denies depression, anxiety or mood swings.    Physical Exam:      PHYSICAL EXAM:  Ht 4' 11" (1.499 m)   Wt 53 kg (116 lb 13.5 oz)   LMP 11/09/2019 (Exact Date)   BMI 23.60 kg/m²   Body mass index is 23.6 kg/m².     APPEARANCE: Well nourished, well developed, in no acute distress.  PSYCH: Appropriate mood and affect.  SKIN: No acne or hirsutism.  NECK: Neck symmetric without masses or thyromegaly.  NODES: No inguinal, axillary, or supraclavicular lymph node enlargement.  CHEST: Normal respiratory effort.    CARDIOVASCULAR:  Regular rate and rhythm.  LUNGS:  Clear to auscultation bilaterally.  BREASTS: Symmetrical, no skin changes or visible lesions.  No palpable masses or " nipple discharge bilaterally.  ABDOMEN: Soft.  No tenderness or masses.    PELVIC: Normal external genitalia without lesions.  Normal hair distribution.  Adequate perineal body, normal urethral meatus.  Vagina moist and well rugated without lesions or discharge.  Cervix pink, without lesions, discharge or tenderness.  No significant cystocele or rectocele.  Bimanual exam shows uterus to be normal size, regular, mobile and nontender.  Adnexa without masses or tenderness.    EXTREMITIES: No edema.  No tenderness to palpation.    Labs:  upt neg    Assessment/Plan:     24 y.o.     Screening examination for STD (sexually transmitted disease)  -     HIV 1/2 Ag/Ab (4th Gen); Future; Expected date: 2019  -     Hepatitis B surface antigen; Future; Expected date: 2019  -     C. trachomatis/N. gonorrhoeae by AMP DNA  -     RPR; Future; Expected date: 2019    Well woman exam          Counselin.  Annual exam performed today without difficulty.  Patient was counseled today on current ASCCP pap guidelines, the recommendation for yearly pelvic exams, healthy diet and exercise routines, breast self awareness.  Pap smear up to date.  All questions answered.    2.  Contraception:  Declines.  3.  STD testing:  Ordered.  4.  Follow up with PCP for other health maintenance.  5.  RTC in 1 year or sooner if needed.    Use of the [employer] Patient Portal discussed and encouraged during today's visit.

## 2019-12-06 ENCOUNTER — TELEPHONE (OUTPATIENT)
Dept: OBSTETRICS AND GYNECOLOGY | Facility: CLINIC | Age: 24
End: 2019-12-06

## 2020-01-10 ENCOUNTER — TELEPHONE (OUTPATIENT)
Dept: OBSTETRICS AND GYNECOLOGY | Facility: CLINIC | Age: 25
End: 2020-01-10

## 2020-01-10 NOTE — TELEPHONE ENCOUNTER
Igor pt, says she has a yeast infection. Is experiencing pain with urination, burning sensation, and urinary frequency. Has been having symptoms for 2 days. Is requesting if RX can be sent to [facility] pharmacy in [facility]. Please call pt and advise, thank you.

## 2020-01-10 NOTE — TELEPHONE ENCOUNTER
Pt isn't sure if she has a yeast infection or UTI.  C/o pain when voiding but also has vaginal pain when not voiding and urinary frequency.  [facility].  ER precautions discussed.

## 2020-01-11 ENCOUNTER — OFFICE VISIT (OUTPATIENT)
Dept: OBSTETRICS AND GYNECOLOGY | Facility: CLINIC | Age: 25
End: 2020-01-11
Payer: MEDICAID

## 2020-01-11 VITALS
DIASTOLIC BLOOD PRESSURE: 76 MMHG | SYSTOLIC BLOOD PRESSURE: 116 MMHG | BODY MASS INDEX: 23.24 KG/M2 | WEIGHT: 115.31 LBS | HEIGHT: 59 IN

## 2020-01-11 DIAGNOSIS — N90.89 VULVAR LESION: Primary | ICD-10-CM

## 2020-01-11 PROCEDURE — 99214 PR OFFICE/OUTPT VISIT, EST, LEVL IV, 30-39 MIN: ICD-10-PCS | Mod: S$PBB,,, | Performed by: OBSTETRICS & GYNECOLOGY

## 2020-01-11 PROCEDURE — 99214 OFFICE O/P EST MOD 30 MIN: CPT | Mod: S$PBB,,, | Performed by: OBSTETRICS & GYNECOLOGY

## 2020-01-11 PROCEDURE — 99213 OFFICE O/P EST LOW 20 MIN: CPT | Mod: PBBFAC

## 2020-01-11 PROCEDURE — 99999 PR PBB SHADOW E&M-EST. PATIENT-LVL III: ICD-10-PCS | Mod: PBBFAC,,,

## 2020-01-11 PROCEDURE — 99999 PR PBB SHADOW E&M-EST. PATIENT-LVL III: CPT | Mod: PBBFAC,,,

## 2020-01-11 PROCEDURE — 87529 HSV DNA AMP PROBE: CPT

## 2020-01-11 RX ORDER — VALACYCLOVIR HYDROCHLORIDE 1 G/1
2000 TABLET, FILM COATED ORAL 2 TIMES DAILY
Qty: 4 TABLET | Refills: 0 | Status: SHIPPED | OUTPATIENT
Start: 2020-01-11 | End: 2020-01-12

## 2020-01-11 RX ORDER — VALACYCLOVIR HYDROCHLORIDE 1 G/1
1000 TABLET, FILM COATED ORAL 2 TIMES DAILY
Qty: 20 TABLET | Refills: 1 | Status: SHIPPED | OUTPATIENT
Start: 2020-01-11 | End: 2020-01-21

## 2020-01-11 NOTE — PROGRESS NOTES
"Paz Bentley is a 24 y.o. female  p[facility] with complaint of vulvar pain and a sore spot.  Reports painful with walking and standing.         ROS:  GENERAL: No fever, chills, fatigability or weight loss.  VULVAR: No pain,  and no itching. Reports pain at L labia and sore spot.  VAGINAL: No relaxation, no abnormal bleeding and no lesions.  ABDOMEN: No abdominal pain. Denies nausea. Denies vomiting. No diarrhea. No constipation  BREAST: Denies pain. No lumps. No discharge.  URINARY: No incontinence, no nocturia, no frequency and no dysuria.  CARDIOVASCULAR: No chest pain. No shortness of breath. No leg cramps.  NEUROLOGICAL: No headaches. No vision changes.      Review of patient's allergies indicates:  No Known Allergies    Current Outpatient Medications:     drospirenone-ethinyl estradiol (JOSE) 3-0.02 mg per tablet, Take 1 tablet by mouth once daily., Disp: 28 tablet, Rfl: 11    metroNIDAZOLE (FLAGYL) 500 MG tablet, Take 1 tablet (500 mg total) by mouth 2 (two) times daily., Disp: 14 tablet, Rfl: 0    pseudoephedrine-DM-guaifenesin (POLY-VENT DM) 60- mg Tab, Take 1 tablet by mouth every 6 (six) hours as needed., Disp: 20 tablet, Rfl: 0    valACYclovir (VALTREX) 1000 MG tablet, Take 1 tablet (1,000 mg total) by mouth 2 (two) times daily. for 10 days, Disp: 20 tablet, Rfl: 1    valACYclovir (VALTREX) 1000 MG tablet, Take 2 tablets (2,000 mg total) by mouth 2 (two) times daily. for 1 day, Disp: 4 tablet, Rfl: 0    History reviewed. No pertinent past medical history.  History reviewed. No pertinent surgical history.  Social History     Tobacco Use    Smoking status: Never Smoker    Smokeless tobacco: Never Used   Substance Use Topics    Alcohol use: Yes     Comment: rare    Drug use: No     OB History    Para Term  AB Living   0 0 0 0 0 0   SAB TAB Ectopic Multiple Live Births   0 0 0 0 0   Obstetric Comments   Menarche at 14       /76   Ht 4' 11" (1.499 m) "   Wt 52.3 kg (115 lb 4.8 oz)   LMP 01/02/2020   BMI 23.29 kg/m²     PHYSICAL EXAM:  GENERAL: Calm and appropriate affect, alert, oriented x4  SKIN: Color appropriate for race, warm and dry, clean and intact with no rashes.  RESP: Even, unlabored breathing  ABDOMEN: Soft, nontender, no masses.  :   Normal external female genitalia , small cluster herpetic lesions noted on mid L labia, culture obtained. Normal hair distribution. Adequate perineal body, normal urethral meatus.  Vagina pink and well rugated, no lesions, vaginal discharge - normal  No significant cystocele or rectocele.  Cervix pink without discharge or lesions, no cervical motion tenderness.  Uterus 4-6 weeks size, regular, mobile and nontender.  Adnexa: normal adnexa in size, nontender and no masses        ASSESSMENT / PLAN:    ICD-10-CM ICD-9-CM    1. Vulvar lesion N90.89 624.8 Herpes simplex virus culture [facility]; Other (Specify) (L [facility])      valACYclovir (VALTREX) 1000 MG tablet      valACYclovir (VALTREX) 1000 MG tablet     Vulvar lesion  -     Herpes simplex virus culture [facility]; Other (Specify) (L [facility])  -     valACYclovir (VALTREX) 1000 MG tablet; Take 1 tablet (1,000 mg total) by mouth 2 (two) times daily. for 10 days  Dispense: 20 tablet; Refill: 1  -     valACYclovir (VALTREX) 1000 MG tablet; Take 2 tablets (2,000 mg total) by mouth 2 (two) times daily. for 1 day  Dispense: 4 tablet; Refill: 0      Lengthy discussion with pt re etiology , treatment and mgmt of HSV. Rx valtrex provided with instructions and discussed follow up p culture results.    Patient was counseled today on vaginitis prevention including :  a. avoiding feminine products such as deoderant soaps, body wash, bubble bath, douches, scented toilet paper, deoderant tampons or pads, feminine wipes, chronic pad use, etc.  b. avoiding other vulvovaginal irritants such as long hot baths, humidity, tight, synthetic clothing, chlorine and sitting around in wet bathing  suits  c. wearing cotton underwear, avoiding thong underwear and no underwear to bed  d. taking showers instead of baths and use a hair dryer on cool setting afterwards to dry  e. wearing cotton to exercise and shower immediately after exercise and change clothes  f. using polyurethane condoms without spermicide if sexually active and symptoms are triggered by intercourse  g. Discussed use of vagisil, along with repHresh and probiotics    FOLLOW UP:   Pending lab results, PRN lack of improvement.

## 2020-01-13 ENCOUNTER — PATIENT MESSAGE (OUTPATIENT)
Dept: OBSTETRICS AND GYNECOLOGY | Facility: CLINIC | Age: 25
End: 2020-01-13

## 2020-01-14 ENCOUNTER — TELEPHONE (OUTPATIENT)
Dept: OBSTETRICS AND GYNECOLOGY | Facility: CLINIC | Age: 25
End: 2020-01-14

## 2020-01-14 LAB
HSV1 DNA SPEC QL NAA+PROBE: NEGATIVE
HSV2 DNA SPEC QL NAA+PROBE: POSITIVE
SPECIMEN SOURCE: ABNORMAL

## 2020-01-14 NOTE — TELEPHONE ENCOUNTER
Spoke to pt ands advised that HSV culture has not yet resulted, advised to continue on the medication as prescribed and that I will notify her as soon as results are available.

## 2020-01-14 NOTE — TELEPHONE ENCOUNTER
----- Message from Maddy Huston MA sent at 1/14/2020  3:13 PM CST -----      ----- Message -----  From: Maddy Huston MA  Sent: 1/14/2020   1:24 PM CST  To: [facility] Staff        ----- Message -----  From: Maddy Huston MA  Sent: 1/13/2020   4:32 PM CST  To: ANNE-MARIE Camargo [facility] Staff  Caller: MOLLY FREGOSO [[phone]] (Today,  2:29 PM)          Name of Who is Calling:     What is the request in detail: patient request call back in reference to question /concerns from visit on 1/11/20  Please contact to further discuss and advise       Can the clinic reply by [facility]: no     What Number to Call Back if not in [facility]:  [phone]

## 2020-01-15 ENCOUNTER — PATIENT MESSAGE (OUTPATIENT)
Dept: OBSTETRICS AND GYNECOLOGY | Facility: CLINIC | Age: 25
End: 2020-01-15

## 2020-01-27 ENCOUNTER — PATIENT MESSAGE (OUTPATIENT)
Dept: OBSTETRICS AND GYNECOLOGY | Facility: CLINIC | Age: 25
End: 2020-01-27

## 2020-01-30 ENCOUNTER — OFFICE VISIT (OUTPATIENT)
Dept: OBSTETRICS AND GYNECOLOGY | Facility: CLINIC | Age: 25
End: 2020-01-30
Attending: STUDENT IN AN ORGANIZED HEALTH CARE EDUCATION/TRAINING PROGRAM
Payer: MEDICAID

## 2020-01-30 VITALS
DIASTOLIC BLOOD PRESSURE: 76 MMHG | WEIGHT: 116.06 LBS | BODY MASS INDEX: 23.4 KG/M2 | HEIGHT: 59 IN | SYSTOLIC BLOOD PRESSURE: 112 MMHG

## 2020-01-30 DIAGNOSIS — A60.00 GENITAL HERPES SIMPLEX, UNSPECIFIED SITE: Primary | ICD-10-CM

## 2020-01-30 PROCEDURE — 99213 OFFICE O/P EST LOW 20 MIN: CPT | Mod: S$PBB,,, | Performed by: STUDENT IN AN ORGANIZED HEALTH CARE EDUCATION/TRAINING PROGRAM

## 2020-01-30 PROCEDURE — 99213 OFFICE O/P EST LOW 20 MIN: CPT | Mod: PBBFAC | Performed by: STUDENT IN AN ORGANIZED HEALTH CARE EDUCATION/TRAINING PROGRAM

## 2020-01-30 PROCEDURE — 99213 PR OFFICE/OUTPT VISIT, EST, LEVL III, 20-29 MIN: ICD-10-PCS | Mod: S$PBB,,, | Performed by: STUDENT IN AN ORGANIZED HEALTH CARE EDUCATION/TRAINING PROGRAM

## 2020-01-30 PROCEDURE — 99999 PR PBB SHADOW E&M-EST. PATIENT-LVL III: CPT | Mod: PBBFAC,,, | Performed by: STUDENT IN AN ORGANIZED HEALTH CARE EDUCATION/TRAINING PROGRAM

## 2020-01-30 PROCEDURE — 99999 PR PBB SHADOW E&M-EST. PATIENT-LVL III: ICD-10-PCS | Mod: PBBFAC,,, | Performed by: STUDENT IN AN ORGANIZED HEALTH CARE EDUCATION/TRAINING PROGRAM

## 2020-02-01 RX ORDER — VALACYCLOVIR HYDROCHLORIDE 1 G/1
1000 TABLET, FILM COATED ORAL DAILY
Qty: 30 TABLET | Refills: 11 | Status: SHIPPED | OUTPATIENT
Start: 2020-02-01 | End: 2020-05-20 | Stop reason: SDUPTHER

## 2020-02-02 NOTE — PROGRESS NOTES
Chief Complaint: Vaginal irritation follow up     HPI:      Paz Bentley is a 24 y.o.  who presents today for follow up.  Seen last month for outbreak of genital herpes.  This was her first outbreak.  Taking valtrex which has resolved symptoms.  No other issues or concerns.  No lesions.  LMP: Patient's last menstrual period was 2020 (exact date).    OB History        0    Para   0    Term   0       0    AB   0    Living   0       SAB   0    TAB   0    Ectopic   0    Multiple   0    Live Births   0           Obstetric Comments   Menarche at 14           History reviewed. No pertinent past medical history.  History reviewed. No pertinent surgical history.  Social History     Socioeconomic History    Marital status: Single     Spouse name: Not on file    Number of children: Not on file    Years of education: Not on file    Highest education level: Not on file   Occupational History    Not on file   Social Needs    Financial resource strain: Not on file    Food insecurity:     Worry: Not on file     Inability: Not on file    Transportation needs:     Medical: Not on file     Non-medical: Not on file   Tobacco Use    Smoking status: Never Smoker    Smokeless tobacco: Never Used   Substance and Sexual Activity    Alcohol use: Yes     Comment: rare    Drug use: No    Sexual activity: Yes     Partners: Male     Birth control/protection: None   Lifestyle    Physical activity:     Days per week: Not on file     Minutes per session: Not on file    Stress: Not on file   Relationships    Social connections:     Talks on phone: Not on file     Gets together: Not on file     Attends [employer] service: Not on file     Active member of club or organization: Not on file     Attends meetings of clubs or organizations: Not on file     Relationship status: Not on file   Other Topics Concern    Not on file   Social History Narrative    Not on file     Family History   Problem Relation Age of  "Onset    Breast cancer Maternal Grandmother     Colon cancer Neg Hx     Ovarian cancer Neg Hx        Current Outpatient Medications:     drospirenone-ethinyl estradiol (JOSE) 3-0.02 mg per tablet, Take 1 tablet by mouth once daily., Disp: 28 tablet, Rfl: 11    metroNIDAZOLE (FLAGYL) 500 MG tablet, Take 1 tablet (500 mg total) by mouth 2 (two) times daily., Disp: 14 tablet, Rfl: 0    pseudoephedrine-DM-guaifenesin (POLY-VENT DM) 60- mg Tab, Take 1 tablet by mouth every 6 (six) hours as needed., Disp: 20 tablet, Rfl: 0    valACYclovir (VALTREX) 1000 MG tablet, Take 1 tablet (1,000 mg total) by mouth 2 (two) times daily. for 10 days, Disp: 20 tablet, Rfl: 1    ROS:     GENERAL: Denies unintentional weight gain or weight loss. Feeling well overall.   SKIN: Denies rash or lesions.   HEENT: Denies headaches, or vision changes.   CARDIOVASCULAR: Denies palpitations or chest pain.   RESPIRATORY: Denies shortness of breath or dyspnea on exertion.  BREASTS: Denies pain, lumps, or nipple discharge.   ABDOMEN: Denies abdominal pain, constipation, diarrhea, nausea, vomiting, change in appetite.  URINARY: Denies frequency, dysuria, hematuria.  NEUROLOGIC: Denies syncope or weakness.   PSYCHIATRIC: Denies depression, anxiety or mood swings.    Physical Exam:      PHYSICAL EXAM:  /76   Ht 4' 11" (1.499 m)   Wt 52.7 kg (116 lb 1.2 oz)   LMP 2020 (Exact Date)   BMI 23.44 kg/m²   Body mass index is 23.44 kg/m².     APPEARANCE: Well nourished, well developed, in no acute distress.  PSYCH: Appropriate mood and affect.  SKIN: No acne or hirsutism.   EXTREMITIES: No edema.  No tenderness to palpation.      Assessment/Plan:     24 y.o.     Genital herpes simplex, unspecified site    Other orders  -     valACYclovir (VALTREX) 1000 MG tablet; Take 1 tablet (1,000 mg total) by mouth once daily.  Dispense: 30 tablet; Refill: 11          Counselin.  Genital herpes:  Reviewed diagnosis with patient.  " Also discussed treatment options including episodic vs suppressive options.  She would like to try daily suppressive options.  R/BA reviewed and all questions answered.    2.  Follow up with PCP for other health maintenance.  3.  RTC for annual or sooner if needed.    Use of the [employer] Patient Portal discussed and encouraged during today's visit.

## 2020-05-08 ENCOUNTER — PATIENT MESSAGE (OUTPATIENT)
Dept: OBSTETRICS AND GYNECOLOGY | Facility: CLINIC | Age: 25
End: 2020-05-08

## 2020-05-20 ENCOUNTER — PATIENT MESSAGE (OUTPATIENT)
Dept: OBSTETRICS AND GYNECOLOGY | Facility: CLINIC | Age: 25
End: 2020-05-20

## 2020-05-21 RX ORDER — VALACYCLOVIR HYDROCHLORIDE 1 G/1
1000 TABLET, FILM COATED ORAL DAILY
Qty: 30 TABLET | Refills: 6 | Status: SHIPPED | OUTPATIENT
Start: 2020-05-21 | End: 2020-06-16 | Stop reason: SDUPTHER

## 2020-05-28 ENCOUNTER — PATIENT MESSAGE (OUTPATIENT)
Dept: OBSTETRICS AND GYNECOLOGY | Facility: CLINIC | Age: 25
End: 2020-05-28

## 2020-06-01 ENCOUNTER — OFFICE VISIT (OUTPATIENT)
Dept: OBSTETRICS AND GYNECOLOGY | Facility: CLINIC | Age: 25
End: 2020-06-01
Attending: STUDENT IN AN ORGANIZED HEALTH CARE EDUCATION/TRAINING PROGRAM
Payer: MEDICAID

## 2020-06-01 VITALS
DIASTOLIC BLOOD PRESSURE: 66 MMHG | HEIGHT: 59 IN | SYSTOLIC BLOOD PRESSURE: 106 MMHG | BODY MASS INDEX: 22.38 KG/M2 | WEIGHT: 111 LBS

## 2020-06-01 DIAGNOSIS — N89.8 VAGINAL DISCHARGE: Primary | ICD-10-CM

## 2020-06-01 DIAGNOSIS — N76.0 BV (BACTERIAL VAGINOSIS): ICD-10-CM

## 2020-06-01 DIAGNOSIS — R82.90 URINE ABNORMALITY: ICD-10-CM

## 2020-06-01 DIAGNOSIS — Z11.3 SCREEN FOR STD (SEXUALLY TRANSMITTED DISEASE): ICD-10-CM

## 2020-06-01 DIAGNOSIS — B96.89 BV (BACTERIAL VAGINOSIS): ICD-10-CM

## 2020-06-01 DIAGNOSIS — N89.8 VAGINAL ODOR: ICD-10-CM

## 2020-06-01 DIAGNOSIS — Z32.02 URINE PREGNANCY TEST NEGATIVE: ICD-10-CM

## 2020-06-01 LAB
BACTERIA HYPHAE, POC: NEGATIVE
BILIRUB SERPL-MCNC: NEGATIVE MG/DL
BLOOD URINE, POC: NEGATIVE
COLOR, POC UA: NORMAL
GARDNERELLA VAGINALIS: POSITIVE
GLUCOSE UR QL STRIP: NEGATIVE
KETONES UR QL STRIP: NEGATIVE
LEUKOCYTE ESTERASE URINE, POC: NEGATIVE
NITRITE, POC UA: NEGATIVE
OTHER MICROSC. OBSERVATIONS: ABNORMAL
PH, POC UA: 5
POC BACTERIAL VAGINOSIS: POSITIVE
POC CLUE CELLS: NEGATIVE
PROTEIN, POC: NEGATIVE
SPECIFIC GRAVITY, POC UA: 1.03
TRICHOMONAS, POC: NEGATIVE
UROBILINOGEN, POC UA: NORMAL
YEAST WET PREP: NEGATIVE
YEAST, POC: NEGATIVE

## 2020-06-01 PROCEDURE — 99213 PR OFFICE/OUTPT VISIT, EST, LEVL III, 20-29 MIN: ICD-10-PCS | Mod: S$PBB,,, | Performed by: STUDENT IN AN ORGANIZED HEALTH CARE EDUCATION/TRAINING PROGRAM

## 2020-06-01 PROCEDURE — 99999 PR PBB SHADOW E&M-EST. PATIENT-LVL III: ICD-10-PCS | Mod: PBBFAC,,, | Performed by: STUDENT IN AN ORGANIZED HEALTH CARE EDUCATION/TRAINING PROGRAM

## 2020-06-01 PROCEDURE — 87491 CHLMYD TRACH DNA AMP PROBE: CPT

## 2020-06-01 PROCEDURE — 81002 URINALYSIS NONAUTO W/O SCOPE: CPT | Mod: PBBFAC,PN | Performed by: STUDENT IN AN ORGANIZED HEALTH CARE EDUCATION/TRAINING PROGRAM

## 2020-06-01 PROCEDURE — 99213 OFFICE O/P EST LOW 20 MIN: CPT | Mod: S$PBB,,, | Performed by: STUDENT IN AN ORGANIZED HEALTH CARE EDUCATION/TRAINING PROGRAM

## 2020-06-01 PROCEDURE — 87086 URINE CULTURE/COLONY COUNT: CPT

## 2020-06-01 PROCEDURE — 99999 PR PBB SHADOW E&M-EST. PATIENT-LVL III: CPT | Mod: PBBFAC,,, | Performed by: STUDENT IN AN ORGANIZED HEALTH CARE EDUCATION/TRAINING PROGRAM

## 2020-06-01 PROCEDURE — 99213 OFFICE O/P EST LOW 20 MIN: CPT | Mod: PBBFAC,PN | Performed by: STUDENT IN AN ORGANIZED HEALTH CARE EDUCATION/TRAINING PROGRAM

## 2020-06-01 RX ORDER — METRONIDAZOLE 500 MG/1
500 TABLET ORAL 2 TIMES DAILY
Qty: 14 TABLET | Refills: 0 | Status: SHIPPED | OUTPATIENT
Start: 2020-06-01 | End: 2020-12-30 | Stop reason: SDUPTHER

## 2020-06-01 NOTE — PROGRESS NOTES
Chief Complaint: Vaginal odor     HPI:      Paz Bentley is a 25 y.o.  who presents today for evaluation of vaginal odor and discharge for the past few days.  Also some malodorous urine.  Also wants STD screening.  LMP: Patient's last menstrual period was 2020 (exact date).  No other issues, problems, or complaints. Specifically, patient denies abnormal vaginal bleeding, discharge, pelvic pain, urinary problems, or changes in appetite. Declines contraception.      History reviewed. No pertinent past medical history.  History reviewed. No pertinent surgical history.  Social History     Socioeconomic History    Marital status: Single     Spouse name: Not on file    Number of children: Not on file    Years of education: Not on file    Highest education level: Not on file   Occupational History    Not on file   Social Needs    Financial resource strain: Not on file    Food insecurity:     Worry: Not on file     Inability: Not on file    Transportation needs:     Medical: Not on file     Non-medical: Not on file   Tobacco Use    Smoking status: Never Smoker    Smokeless tobacco: Never Used   Substance and Sexual Activity    Alcohol use: Yes     Comment: rare    Drug use: No    Sexual activity: Yes     Partners: Male     Birth control/protection: None   Lifestyle    Physical activity:     Days per week: Not on file     Minutes per session: Not on file    Stress: Not on file   Relationships    Social connections:     Talks on phone: Not on file     Gets together: Not on file     Attends Yarsani service: Not on file     Active member of club or organization: Not on file     Attends meetings of clubs or organizations: Not on file     Relationship status: Not on file   Other Topics Concern    Not on file   Social History Narrative    Not on file     Family History   Problem Relation Age of Onset    Breast cancer Maternal Grandmother     Colon cancer Neg Hx     Ovarian cancer Neg Hx   "      Current Outpatient Medications:     valACYclovir (VALTREX) 1000 MG tablet, Take 1 tablet (1,000 mg total) by mouth once daily., Disp: 30 tablet, Rfl: 6    drospirenone-ethinyl estradiol (JOSE) 3-0.02 mg per tablet, Take 1 tablet by mouth once daily., Disp: 28 tablet, Rfl: 11    metroNIDAZOLE (FLAGYL) 500 MG tablet, Take 1 tablet (500 mg total) by mouth 2 (two) times daily., Disp: 14 tablet, Rfl: 0    pseudoephedrine-DM-guaifenesin (POLY-VENT DM) 60- mg Tab, Take 1 tablet by mouth every 6 (six) hours as needed., Disp: 20 tablet, Rfl: 0    ROS:     GENERAL: Denies unintentional weight gain or weight loss. Feeling well overall.   SKIN: Denies rash or lesions.   HEENT: Denies headaches, or vision changes.   CARDIOVASCULAR: Denies palpitations or chest pain.   RESPIRATORY: Denies shortness of breath or dyspnea on exertion.  BREASTS: Denies pain, lumps, or nipple discharge.   ABDOMEN: Denies abdominal pain, constipation, diarrhea, nausea, vomiting, change in appetite.  URINARY: Denies frequency, dysuria, hematuria.  NEUROLOGIC: Denies syncope or weakness.   PSYCHIATRIC: Denies depression, anxiety or mood swings.    Physical Exam:      PHYSICAL EXAM:  /66   Ht 4' 11" (1.499 m)   Wt 50.4 kg (111 lb)   LMP 05/16/2020 (Exact Date)   BMI 22.42 kg/m²   Body mass index is 22.42 kg/m².     APPEARANCE: Well nourished, well developed, in no acute distress.  PSYCH: Appropriate mood and affect.  SKIN: No acne or hirsutism.  ABDOMEN: Soft.  No tenderness or masses.    PELVIC: Normal external genitalia without lesions.  Normal hair distribution.  Adequate perineal body, normal urethral meatus.  Vagina moist and well rugated without lesions.  Thin, grey discharge.  Cervix pink, without lesions, discharge or tenderness.  No significant cystocele or rectocele.  Bimanual exam shows uterus to be normal size, regular, mobile and nontender.  Adnexa without masses or tenderness.    EXTREMITIES: No edema.  No tenderness " to palpation.    Labs:  upt neg    Assessment/Plan:     25 y.o.     Vaginal discharge  -     POCT KOH  -     POCT WET PREP  -     POCT urine dipstick without microscope    Vaginal odor  -     POCT KOH  -     POCT WET PREP  -     POCT urine dipstick without microscope    Screen for STD (sexually transmitted disease)  -     RPR; Future; Expected date: 2020  -     HIV 1/2 Ag/Ab (4th Gen); Future; Expected date: 2020  -     C. trachomatis/N. gonorrhoeae by AMP DNA [facility]; Cervicovaginal    BV (bacterial vaginosis)  -     metroNIDAZOLE (FLAGYL) 500 MG tablet; Take 1 tablet (500 mg total) by mouth 2 (two) times daily.  Dispense: 14 tablet; Refill: 0    Urine pregnancy test negative  -     POCT urine pregnancy    Urine abnormality  -     Urine culture          Counselin.  Bacterial vaginosis:  Flagyl to pharmacy.  No EtOH while on meds.  Urine culture also sent.  Will follow up results.  2.  Follow up with PCP for other health maintenance.  3.  RTC in 1 year or sooner if needed.    Use of the [employer] Patient Portal discussed and encouraged during today's visit.

## 2020-06-02 LAB
BACTERIA UR CULT: NORMAL
BACTERIA UR CULT: NORMAL
C TRACH DNA SPEC QL NAA+PROBE: NOT DETECTED
N GONORRHOEA DNA SPEC QL NAA+PROBE: NOT DETECTED

## 2020-06-16 ENCOUNTER — PATIENT MESSAGE (OUTPATIENT)
Dept: OBSTETRICS AND GYNECOLOGY | Facility: CLINIC | Age: 25
End: 2020-06-16

## 2020-08-20 ENCOUNTER — OFFICE VISIT (OUTPATIENT)
Dept: OBSTETRICS AND GYNECOLOGY | Facility: CLINIC | Age: 25
End: 2020-08-20
Attending: STUDENT IN AN ORGANIZED HEALTH CARE EDUCATION/TRAINING PROGRAM
Payer: MEDICAID

## 2020-08-20 VITALS
BODY MASS INDEX: 23.24 KG/M2 | SYSTOLIC BLOOD PRESSURE: 102 MMHG | HEIGHT: 59 IN | WEIGHT: 115.31 LBS | DIASTOLIC BLOOD PRESSURE: 78 MMHG

## 2020-08-20 DIAGNOSIS — Z11.3 SCREEN FOR STD (SEXUALLY TRANSMITTED DISEASE): ICD-10-CM

## 2020-08-20 DIAGNOSIS — N89.8 VAGINAL DISCHARGE: Primary | ICD-10-CM

## 2020-08-20 DIAGNOSIS — Z32.02 NEGATIVE PREGNANCY TEST: ICD-10-CM

## 2020-08-20 LAB
B-HCG UR QL: NEGATIVE
BACTERIA HYPHAE, POC: NEGATIVE
CTP QC/QA: YES
GARDNERELLA VAGINALIS: NEGATIVE
OTHER MICROSC. OBSERVATIONS: NORMAL
POC BACTERIAL VAGINOSIS: NEGATIVE
POC CLUE CELLS: NEGATIVE
TRICHOMONAS, POC: NEGATIVE
YEAST WET PREP: NEGATIVE
YEAST, POC: NEGATIVE

## 2020-08-20 PROCEDURE — 99213 OFFICE O/P EST LOW 20 MIN: CPT | Mod: PBBFAC | Performed by: STUDENT IN AN ORGANIZED HEALTH CARE EDUCATION/TRAINING PROGRAM

## 2020-08-20 PROCEDURE — 99213 OFFICE O/P EST LOW 20 MIN: CPT | Mod: S$PBB,,, | Performed by: STUDENT IN AN ORGANIZED HEALTH CARE EDUCATION/TRAINING PROGRAM

## 2020-08-20 PROCEDURE — 99999 PR PBB SHADOW E&M-EST. PATIENT-LVL III: ICD-10-PCS | Mod: PBBFAC,,, | Performed by: STUDENT IN AN ORGANIZED HEALTH CARE EDUCATION/TRAINING PROGRAM

## 2020-08-20 PROCEDURE — 87591 N.GONORRHOEAE DNA AMP PROB: CPT

## 2020-08-20 PROCEDURE — 99999 PR PBB SHADOW E&M-EST. PATIENT-LVL III: CPT | Mod: PBBFAC,,, | Performed by: STUDENT IN AN ORGANIZED HEALTH CARE EDUCATION/TRAINING PROGRAM

## 2020-08-20 PROCEDURE — 99213 PR OFFICE/OUTPT VISIT, EST, LEVL III, 20-29 MIN: ICD-10-PCS | Mod: S$PBB,,, | Performed by: STUDENT IN AN ORGANIZED HEALTH CARE EDUCATION/TRAINING PROGRAM

## 2020-08-20 RX ORDER — TRIAMCINOLONE ACETONIDE 1 MG/G
CREAM TOPICAL 2 TIMES DAILY
Qty: 28.4 G | Refills: 0 | Status: SHIPPED | OUTPATIENT
Start: 2020-08-20 | End: 2020-08-30

## 2020-08-20 RX ORDER — NYSTATIN 100000 U/G
CREAM TOPICAL 2 TIMES DAILY
Qty: 30 G | Refills: 0 | Status: SHIPPED | OUTPATIENT
Start: 2020-08-20 | End: 2020-11-12

## 2020-08-25 LAB
C TRACH RRNA SPEC QL NAA+PROBE: NEGATIVE
N GONORRHOEA RRNA SPEC QL NAA+PROBE: NEGATIVE

## 2020-11-12 ENCOUNTER — OFFICE VISIT (OUTPATIENT)
Dept: OBSTETRICS AND GYNECOLOGY | Facility: CLINIC | Age: 25
End: 2020-11-12
Attending: STUDENT IN AN ORGANIZED HEALTH CARE EDUCATION/TRAINING PROGRAM
Payer: MEDICAID

## 2020-11-12 VITALS
BODY MASS INDEX: 22.58 KG/M2 | DIASTOLIC BLOOD PRESSURE: 70 MMHG | HEIGHT: 59 IN | SYSTOLIC BLOOD PRESSURE: 110 MMHG | WEIGHT: 112 LBS

## 2020-11-12 DIAGNOSIS — N89.8 VAGINAL DISCHARGE: Primary | ICD-10-CM

## 2020-11-12 DIAGNOSIS — N89.8 VAGINAL ITCHING: ICD-10-CM

## 2020-11-12 LAB
BACTERIA HYPHAE, POC: NEGATIVE
GARDNERELLA VAGINALIS: NEGATIVE
OTHER MICROSC. OBSERVATIONS: ABNORMAL
POC BACTERIAL VAGINOSIS: NEGATIVE
POC CLUE CELLS: NEGATIVE
TRICHOMONAS, POC: NEGATIVE
YEAST WET PREP: POSITIVE
YEAST, POC: POSITIVE

## 2020-11-12 PROCEDURE — 99213 OFFICE O/P EST LOW 20 MIN: CPT | Mod: S$PBB,,, | Performed by: STUDENT IN AN ORGANIZED HEALTH CARE EDUCATION/TRAINING PROGRAM

## 2020-11-12 PROCEDURE — 99213 PR OFFICE/OUTPT VISIT, EST, LEVL III, 20-29 MIN: ICD-10-PCS | Mod: S$PBB,,, | Performed by: STUDENT IN AN ORGANIZED HEALTH CARE EDUCATION/TRAINING PROGRAM

## 2020-11-12 PROCEDURE — 99213 OFFICE O/P EST LOW 20 MIN: CPT | Mod: PBBFAC | Performed by: STUDENT IN AN ORGANIZED HEALTH CARE EDUCATION/TRAINING PROGRAM

## 2020-11-12 PROCEDURE — 99999 PR PBB SHADOW E&M-EST. PATIENT-LVL III: ICD-10-PCS | Mod: PBBFAC,,, | Performed by: STUDENT IN AN ORGANIZED HEALTH CARE EDUCATION/TRAINING PROGRAM

## 2020-11-12 PROCEDURE — 99999 PR PBB SHADOW E&M-EST. PATIENT-LVL III: CPT | Mod: PBBFAC,,, | Performed by: STUDENT IN AN ORGANIZED HEALTH CARE EDUCATION/TRAINING PROGRAM

## 2020-11-12 RX ORDER — FLUCONAZOLE 150 MG/1
150 TABLET ORAL ONCE
Qty: 2 TABLET | Refills: 1 | Status: SHIPPED | OUTPATIENT
Start: 2020-11-12 | End: 2020-11-12

## 2020-11-12 RX ORDER — NYSTATIN 100000 U/G
CREAM TOPICAL 2 TIMES DAILY
Qty: 30 G | Refills: 0 | Status: SHIPPED | OUTPATIENT
Start: 2020-11-12 | End: 2020-11-12

## 2020-11-12 RX ORDER — TRIAMCINOLONE ACETONIDE 1 MG/G
CREAM TOPICAL 2 TIMES DAILY
Qty: 28.4 G | Refills: 0 | Status: SHIPPED | OUTPATIENT
Start: 2020-11-12 | End: 2021-09-20

## 2020-11-13 NOTE — PROGRESS NOTES
Chief Complaint: Vaginal irritation     HPI:      Paz Bentley is a 25 y.o.  who presents today for evaluation of vaginal irritation.  Reports a clumpy, white discharge.  Also reports itching and irritation. LMP: Patient's last menstrual period was 10/26/2020 (exact date).  No other issues, problems, or complaints.     OB History        0    Para   0    Term   0       0    AB   0    Living   0       SAB   0    TAB   0    Ectopic   0    Multiple   0    Live Births   0           Obstetric Comments   Menarche at 14           History reviewed. No pertinent past medical history.  History reviewed. No pertinent surgical history.  Social History     Socioeconomic History    Marital status: Single     Spouse name: Not on file    Number of children: Not on file    Years of education: Not on file    Highest education level: Not on file   Occupational History    Not on file   Social Needs    Financial resource strain: Not on file    Food insecurity     Worry: Not on file     Inability: Not on file    Transportation needs     Medical: Not on file     Non-medical: Not on file   Tobacco Use    Smoking status: Never Smoker    Smokeless tobacco: Never Used   Substance and Sexual Activity    Alcohol use: Yes     Comment: rare    Drug use: No    Sexual activity: Yes     Partners: Male     Birth control/protection: None   Lifestyle    Physical activity     Days per week: Not on file     Minutes per session: Not on file    Stress: Not on file   Relationships    Social connections     Talks on phone: Not on file     Gets together: Not on file     Attends Hinduism service: Not on file     Active member of club or organization: Not on file     Attends meetings of clubs or organizations: Not on file     Relationship status: Not on file   Other Topics Concern    Not on file   Social History Narrative    Not on file     Family History   Problem Relation Age of Onset    Breast cancer Maternal  "Grandmother     Colon cancer Neg Hx     Ovarian cancer Neg Hx        Current Outpatient Medications:     valACYclovir (VALTREX) 1000 MG tablet, Take 1 tablet (1,000 mg total) by mouth once daily., Disp: 30 tablet, Rfl: 6    drospirenone-ethinyl estradiol (JOSE) 3-0.02 mg per tablet, Take 1 tablet by mouth once daily., Disp: 28 tablet, Rfl: 11    fluconazole (DIFLUCAN) 150 MG Tab, Take 1 tablet (150 mg total) by mouth once. Take one tablet. If symptoms persist, repeat dose in 3 days. for 1 dose, Disp: 2 tablet, Rfl: 1    metroNIDAZOLE (FLAGYL) 500 MG tablet, Take 1 tablet (500 mg total) by mouth 2 (two) times daily. (Patient not taking: Reported on 8/20/2020), Disp: 14 tablet, Rfl: 0    nystatin (MYCOSTATIN) ointment, Apply topically 2 (two) times daily., Disp: 30 g, Rfl: 0    pseudoephedrine-DM-guaifenesin (POLY-VENT DM) 60- mg Tab, Take 1 tablet by mouth every 6 (six) hours as needed., Disp: 20 tablet, Rfl: 0    triamcinolone acetonide 0.1% (KENALOG) 0.1 % cream, Apply topically 2 (two) times daily. for 10 days, Disp: 28.4 g, Rfl: 0    ROS:     GENERAL: Denies unintentional weight gain or weight loss. Feeling well overall.   SKIN: Denies rash or lesions.   HEENT: Denies headaches, or vision changes.   CARDIOVASCULAR: Denies palpitations or chest pain.   RESPIRATORY: Denies shortness of breath or dyspnea on exertion.  BREASTS: Denies pain, lumps, or nipple discharge.   ABDOMEN: Denies abdominal pain, constipation, diarrhea, nausea, vomiting, change in appetite.  URINARY: Denies frequency, dysuria, hematuria.  NEUROLOGIC: Denies syncope or weakness.   PSYCHIATRIC: Denies depression, anxiety or mood swings.    Physical Exam:      PHYSICAL EXAM:  /70   Ht 4' 11" (1.499 m)   Wt 50.8 kg (111 lb 15.9 oz)   LMP 10/26/2020 (Exact Date)   BMI 22.62 kg/m²   Body mass index is 22.62 kg/m².     APPEARANCE: Well nourished, well developed, in no acute distress.  PSYCH: Appropriate mood and affect.  SKIN: " No acne or hirsutism.  ABDOMEN: Soft.  No tenderness or masses.    PELVIC: Normal external genitalia without lesions.  Normal hair distribution.  Adequate perineal body, normal urethral meatus.  Vagina moist and well rugated without lesions.  +clumpy, white discharge.  Cervix pink, without lesions, discharge or tenderness.  No significant cystocele or rectocele.  Bimanual exam shows uterus to be normal size, regular, mobile and nontender.  Adnexa without masses or tenderness.    EXTREMITIES: No edema.  No tenderness to palpation.    Labs:  wet prep +yeast    Assessment/Plan:     25 y.o.     Vaginal discharge  -     POCT WET PREP  -     POCT KOH  -     NuSwab Vaginitis (VG)    Vaginal itching  -     POCT WET PREP  -     POCT KOH  -     NuSwab Vaginitis (VG)    Other orders  -     fluconazole (DIFLUCAN) 150 MG Tab; Take 1 tablet (150 mg total) by mouth once. Take one tablet. If symptoms persist, repeat dose in 3 days. for 1 dose  Dispense: 2 tablet; Refill: 1  -     Discontinue: nystatin (MYCOSTATIN) cream; Apply topically 2 (two) times daily.  Dispense: 30 g; Refill: 0  -     triamcinolone acetonide 0.1% (KENALOG) 0.1 % cream; Apply topically 2 (two) times daily. for 10 days  Dispense: 28.4 g; Refill: 0          Counselin.  Vulvovaginal candidiasis:  Diagnosis reviewed with patient and all questions answered.  Medication sent to pharmacy.  Reviewed vulvar and perineal care.  All questions answered.  She will call if symptoms worsen or do not improve.     2.  Follow up with PCP for other health maintenance.  3.  RTC in 6 months for annual or sooner if needed.    Use of the [employer] Patient Portal discussed and encouraged during today's visit.

## 2020-11-19 LAB
A VAGINAE DNA VAG QL NAA+PROBE: NORMAL SCORE
BVAB2 DNA VAG QL NAA+PROBE: NORMAL SCORE
C ALBICANS DNA VAG QL NAA+PROBE: NEGATIVE
C GLABRATA DNA VAG QL NAA+PROBE: NEGATIVE
MEGA1 DNA VAG QL NAA+PROBE: NORMAL SCORE
T VAGINALIS DNA VAG QL NAA+PROBE: NEGATIVE

## 2020-12-30 ENCOUNTER — PATIENT MESSAGE (OUTPATIENT)
Dept: OBSTETRICS AND GYNECOLOGY | Facility: CLINIC | Age: 25
End: 2020-12-30

## 2020-12-30 DIAGNOSIS — B96.89 BV (BACTERIAL VAGINOSIS): ICD-10-CM

## 2020-12-30 DIAGNOSIS — N76.0 BV (BACTERIAL VAGINOSIS): ICD-10-CM

## 2020-12-31 RX ORDER — METRONIDAZOLE 500 MG/1
500 TABLET ORAL 2 TIMES DAILY
Qty: 14 TABLET | Refills: 0 | Status: SHIPPED | OUTPATIENT
Start: 2020-12-31 | End: 2021-01-07

## 2021-04-16 ENCOUNTER — PATIENT MESSAGE (OUTPATIENT)
Dept: RESEARCH | Facility: HOSPITAL | Age: 26
End: 2021-04-16

## 2021-05-19 ENCOUNTER — PATIENT MESSAGE (OUTPATIENT)
Dept: OBSTETRICS AND GYNECOLOGY | Facility: CLINIC | Age: 26
End: 2021-05-19

## 2021-05-19 RX ORDER — FLUCONAZOLE 150 MG/1
150 TABLET ORAL ONCE
Qty: 2 TABLET | Refills: 0 | Status: SHIPPED | OUTPATIENT
Start: 2021-05-19 | End: 2021-05-19

## 2021-07-16 ENCOUNTER — PATIENT MESSAGE (OUTPATIENT)
Dept: OBSTETRICS AND GYNECOLOGY | Facility: CLINIC | Age: 26
End: 2021-07-16

## 2021-07-30 ENCOUNTER — PATIENT MESSAGE (OUTPATIENT)
Dept: OBSTETRICS AND GYNECOLOGY | Facility: CLINIC | Age: 26
End: 2021-07-30

## 2021-08-07 ENCOUNTER — PATIENT MESSAGE (OUTPATIENT)
Dept: OBSTETRICS AND GYNECOLOGY | Facility: CLINIC | Age: 26
End: 2021-08-07

## 2021-09-09 ENCOUNTER — PATIENT MESSAGE (OUTPATIENT)
Dept: OBSTETRICS AND GYNECOLOGY | Facility: CLINIC | Age: 26
End: 2021-09-09

## 2021-09-20 ENCOUNTER — OFFICE VISIT (OUTPATIENT)
Dept: OBSTETRICS AND GYNECOLOGY | Facility: CLINIC | Age: 26
End: 2021-09-20
Attending: STUDENT IN AN ORGANIZED HEALTH CARE EDUCATION/TRAINING PROGRAM
Payer: MEDICAID

## 2021-09-20 VITALS
WEIGHT: 117.81 LBS | DIASTOLIC BLOOD PRESSURE: 64 MMHG | HEIGHT: 59 IN | BODY MASS INDEX: 23.75 KG/M2 | SYSTOLIC BLOOD PRESSURE: 120 MMHG

## 2021-09-20 DIAGNOSIS — Z11.3 SCREENING EXAMINATION FOR STD (SEXUALLY TRANSMITTED DISEASE): ICD-10-CM

## 2021-09-20 DIAGNOSIS — N89.8 VAGINAL DISCHARGE: Primary | ICD-10-CM

## 2021-09-20 PROCEDURE — 87481 CANDIDA DNA AMP PROBE: CPT | Mod: 59 | Performed by: OBSTETRICS & GYNECOLOGY

## 2021-09-20 PROCEDURE — 99213 OFFICE O/P EST LOW 20 MIN: CPT | Mod: S$PBB,,, | Performed by: OBSTETRICS & GYNECOLOGY

## 2021-09-20 PROCEDURE — 99999 PR PBB SHADOW E&M-EST. PATIENT-LVL II: CPT | Mod: PBBFAC,,, | Performed by: OBSTETRICS & GYNECOLOGY

## 2021-09-20 PROCEDURE — 99999 PR PBB SHADOW E&M-EST. PATIENT-LVL II: ICD-10-PCS | Mod: PBBFAC,,, | Performed by: OBSTETRICS & GYNECOLOGY

## 2021-09-20 PROCEDURE — 99213 PR OFFICE/OUTPT VISIT, EST, LEVL III, 20-29 MIN: ICD-10-PCS | Mod: S$PBB,,, | Performed by: OBSTETRICS & GYNECOLOGY

## 2021-09-20 PROCEDURE — 87591 N.GONORRHOEAE DNA AMP PROB: CPT | Performed by: OBSTETRICS & GYNECOLOGY

## 2021-09-20 PROCEDURE — 87491 CHLMYD TRACH DNA AMP PROBE: CPT | Performed by: OBSTETRICS & GYNECOLOGY

## 2021-09-20 PROCEDURE — 99212 OFFICE O/P EST SF 10 MIN: CPT | Mod: PBBFAC | Performed by: OBSTETRICS & GYNECOLOGY

## 2021-09-20 RX ORDER — FLUCONAZOLE 150 MG/1
150 TABLET ORAL ONCE
Qty: 1 TABLET | Refills: 1 | Status: SHIPPED | OUTPATIENT
Start: 2021-09-20 | End: 2021-09-20

## 2021-09-22 LAB
C TRACH DNA SPEC QL NAA+PROBE: NOT DETECTED
N GONORRHOEA DNA SPEC QL NAA+PROBE: NOT DETECTED

## 2021-09-23 LAB
BACTERIAL VAGINOSIS DNA: POSITIVE
CANDIDA GLABRATA DNA: NEGATIVE
CANDIDA KRUSEI DNA: NEGATIVE
CANDIDA RRNA VAG QL PROBE: POSITIVE
T VAGINALIS RRNA GENITAL QL PROBE: NEGATIVE

## 2021-09-23 RX ORDER — METRONIDAZOLE 7.5 MG/G
GEL VAGINAL
Qty: 70 G | Refills: 0 | Status: SHIPPED | OUTPATIENT
Start: 2021-09-23 | End: 2022-12-15

## 2021-10-16 ENCOUNTER — PATIENT MESSAGE (OUTPATIENT)
Dept: OBSTETRICS AND GYNECOLOGY | Facility: CLINIC | Age: 26
End: 2021-10-16
Payer: MEDICAID

## 2021-10-18 ENCOUNTER — PATIENT MESSAGE (OUTPATIENT)
Dept: OBSTETRICS AND GYNECOLOGY | Facility: CLINIC | Age: 26
End: 2021-10-18
Payer: MEDICAID

## 2021-10-18 DIAGNOSIS — N76.0 ACUTE VAGINITIS: Primary | ICD-10-CM

## 2021-10-18 RX ORDER — FLUCONAZOLE 150 MG/1
150 TABLET ORAL DAILY
Qty: 2 TABLET | Refills: 0 | Status: SHIPPED | OUTPATIENT
Start: 2021-10-18 | End: 2021-10-20

## 2021-12-14 ENCOUNTER — TELEPHONE (OUTPATIENT)
Dept: OBSTETRICS AND GYNECOLOGY | Facility: CLINIC | Age: 26
End: 2021-12-14
Payer: MEDICAID

## 2022-01-10 ENCOUNTER — PATIENT MESSAGE (OUTPATIENT)
Dept: OBSTETRICS AND GYNECOLOGY | Facility: CLINIC | Age: 27
End: 2022-01-10
Payer: MEDICAID

## 2022-01-13 ENCOUNTER — PATIENT MESSAGE (OUTPATIENT)
Dept: OBSTETRICS AND GYNECOLOGY | Facility: CLINIC | Age: 27
End: 2022-01-13
Payer: MEDICAID

## 2022-05-31 ENCOUNTER — TELEPHONE (OUTPATIENT)
Dept: OBSTETRICS AND GYNECOLOGY | Facility: CLINIC | Age: 27
End: 2022-05-31
Payer: MEDICAID

## 2022-05-31 ENCOUNTER — PATIENT MESSAGE (OUTPATIENT)
Dept: OBSTETRICS AND GYNECOLOGY | Facility: CLINIC | Age: 27
End: 2022-05-31
Payer: MEDICAID

## 2022-05-31 NOTE — TELEPHONE ENCOUNTER
Positive UPT.  C/o cramping.  No vaginal bleeding.  Reassured her cramping is normal in early pregnancy, may be due to dehydration.  Increase PO hydration.  Monitor as long as she is not bleeding.      LMP 4/28 ~4w5d    Not sure if she wants to keep the pregnancy and would like to discuss with MD. alas Monday.

## 2022-05-31 NOTE — TELEPHONE ENCOUNTER
"Spoke with pt.  States she has cramping but no period.  She was supposed to start period on 5/28 but no bleeding.  Also reports increased appetite, fatigue, and breast tenderness.  Not on OCPs.  Denies UTI symptoms.  Instructed her to take a UPT and call back with results.  She took a test "the other day" but it was "confusing".  Suggested OTC pain reliever for cramping in addition to heat.     Will call back with test results  "

## 2022-06-06 ENCOUNTER — OFFICE VISIT (OUTPATIENT)
Dept: OBSTETRICS AND GYNECOLOGY | Facility: CLINIC | Age: 27
End: 2022-06-06
Attending: STUDENT IN AN ORGANIZED HEALTH CARE EDUCATION/TRAINING PROGRAM
Payer: MEDICAID

## 2022-06-06 VITALS
WEIGHT: 119.5 LBS | DIASTOLIC BLOOD PRESSURE: 58 MMHG | HEIGHT: 59 IN | SYSTOLIC BLOOD PRESSURE: 102 MMHG | BODY MASS INDEX: 24.09 KG/M2

## 2022-06-06 DIAGNOSIS — N89.8 VAGINAL DISCHARGE: ICD-10-CM

## 2022-06-06 DIAGNOSIS — N92.6 MISSED MENSES: Primary | ICD-10-CM

## 2022-06-06 DIAGNOSIS — Z34.90 PREGNANCY, UNSPECIFIED GESTATIONAL AGE: ICD-10-CM

## 2022-06-06 LAB
B-HCG UR QL: POSITIVE
CTP QC/QA: YES

## 2022-06-06 PROCEDURE — 3008F BODY MASS INDEX DOCD: CPT | Mod: CPTII,,, | Performed by: STUDENT IN AN ORGANIZED HEALTH CARE EDUCATION/TRAINING PROGRAM

## 2022-06-06 PROCEDURE — 3078F PR MOST RECENT DIASTOLIC BLOOD PRESSURE < 80 MM HG: ICD-10-PCS | Mod: CPTII,,, | Performed by: STUDENT IN AN ORGANIZED HEALTH CARE EDUCATION/TRAINING PROGRAM

## 2022-06-06 PROCEDURE — 87491 CHLMYD TRACH DNA AMP PROBE: CPT | Performed by: STUDENT IN AN ORGANIZED HEALTH CARE EDUCATION/TRAINING PROGRAM

## 2022-06-06 PROCEDURE — 81025 URINE PREGNANCY TEST: CPT | Mod: PBBFAC,PN | Performed by: STUDENT IN AN ORGANIZED HEALTH CARE EDUCATION/TRAINING PROGRAM

## 2022-06-06 PROCEDURE — 3074F PR MOST RECENT SYSTOLIC BLOOD PRESSURE < 130 MM HG: ICD-10-PCS | Mod: CPTII,,, | Performed by: STUDENT IN AN ORGANIZED HEALTH CARE EDUCATION/TRAINING PROGRAM

## 2022-06-06 PROCEDURE — 87086 URINE CULTURE/COLONY COUNT: CPT | Performed by: STUDENT IN AN ORGANIZED HEALTH CARE EDUCATION/TRAINING PROGRAM

## 2022-06-06 PROCEDURE — 3078F DIAST BP <80 MM HG: CPT | Mod: CPTII,,, | Performed by: STUDENT IN AN ORGANIZED HEALTH CARE EDUCATION/TRAINING PROGRAM

## 2022-06-06 PROCEDURE — 3074F SYST BP LT 130 MM HG: CPT | Mod: CPTII,,, | Performed by: STUDENT IN AN ORGANIZED HEALTH CARE EDUCATION/TRAINING PROGRAM

## 2022-06-06 PROCEDURE — 99203 OFFICE O/P NEW LOW 30 MIN: CPT | Mod: S$PBB,TH,, | Performed by: STUDENT IN AN ORGANIZED HEALTH CARE EDUCATION/TRAINING PROGRAM

## 2022-06-06 PROCEDURE — 87591 N.GONORRHOEAE DNA AMP PROB: CPT | Performed by: STUDENT IN AN ORGANIZED HEALTH CARE EDUCATION/TRAINING PROGRAM

## 2022-06-06 PROCEDURE — 99999 PR PBB SHADOW E&M-EST. PATIENT-LVL III: ICD-10-PCS | Mod: PBBFAC,,, | Performed by: STUDENT IN AN ORGANIZED HEALTH CARE EDUCATION/TRAINING PROGRAM

## 2022-06-06 PROCEDURE — 1159F PR MEDICATION LIST DOCUMENTED IN MEDICAL RECORD: ICD-10-PCS | Mod: CPTII,,, | Performed by: STUDENT IN AN ORGANIZED HEALTH CARE EDUCATION/TRAINING PROGRAM

## 2022-06-06 PROCEDURE — 99203 PR OFFICE/OUTPT VISIT, NEW, LEVL III, 30-44 MIN: ICD-10-PCS | Mod: S$PBB,TH,, | Performed by: STUDENT IN AN ORGANIZED HEALTH CARE EDUCATION/TRAINING PROGRAM

## 2022-06-06 PROCEDURE — 3008F PR BODY MASS INDEX (BMI) DOCUMENTED: ICD-10-PCS | Mod: CPTII,,, | Performed by: STUDENT IN AN ORGANIZED HEALTH CARE EDUCATION/TRAINING PROGRAM

## 2022-06-06 PROCEDURE — 1159F MED LIST DOCD IN RCRD: CPT | Mod: CPTII,,, | Performed by: STUDENT IN AN ORGANIZED HEALTH CARE EDUCATION/TRAINING PROGRAM

## 2022-06-06 PROCEDURE — 99999 PR PBB SHADOW E&M-EST. PATIENT-LVL III: CPT | Mod: PBBFAC,,, | Performed by: STUDENT IN AN ORGANIZED HEALTH CARE EDUCATION/TRAINING PROGRAM

## 2022-06-06 PROCEDURE — 99213 OFFICE O/P EST LOW 20 MIN: CPT | Mod: PBBFAC,TH,PN | Performed by: STUDENT IN AN ORGANIZED HEALTH CARE EDUCATION/TRAINING PROGRAM

## 2022-06-06 NOTE — PROGRESS NOTES
"  Chief Complaint: Absence of Menses     HPI:      Paz Bentley is a 27 y.o.  who presents complaining of absence of menses.  She reports some mild cramping. Denies bleeding.  No nausea or emesis.  Pregnancy was unplanned.  She is still deciding her next steps.    GYN Hx:  LMP early May.  Cycles occur monthly.  History reviewed. No pertinent past medical history.  History reviewed. No pertinent surgical history.  Social History     Tobacco Use    Smoking status: Never Smoker    Smokeless tobacco: Never Used   Substance Use Topics    Alcohol use: Yes     Comment: rare    Drug use: No     Family History   Problem Relation Age of Onset    Breast cancer Maternal Grandmother     Colon cancer Neg Hx     Ovarian cancer Neg Hx      OB History    Para Term  AB Living   1 0 0 0 0 0   SAB IAB Ectopic Multiple Live Births   0 0 0 0 0      # Outcome Date GA Lbr Ziggy/2nd Weight Sex Delivery Anes PTL Lv   1 Current               Obstetric Comments   Menarche at 14       Current Outpatient Medications:     metroNIDAZOLE (METROGEL VAGINAL) 0.75 % vaginal gel, 1 applicator per vagina qhs x 5 nights then once weekly until tube is empty (Patient not taking: Reported on 2022), Disp: 70 g, Rfl: 0    valACYclovir (VALTREX) 1000 MG tablet, Take 1 tablet (1,000 mg total) by mouth once daily., Disp: 30 tablet, Rfl: 6  ROS:     GENERAL: Denies weight gain or weight loss. Feeling well overall.   SKIN: Denies rash or lesions.   BREASTS: Denies lumps or nipple discharge. + tenderness.  ABDOMEN: Denies abdominal pain, constipation, diarrhea. no nausea/no vomiting  URINARY: Denies dysuria, hematuria. + frequency.  NEUROLOGIC: Denies syncope or weakness.   PSYCHIATRIC: Denies depression, anxiety or mood swings.    Physical Exam:      PHYSICAL EXAM:  BP (!) 102/58   Ht 4' 11" (1.499 m)   Wt 54.2 kg (119 lb 7.8 oz)   LMP 2022   BMI 24.13 kg/m²   Body mass index is 24.13 kg/m².     APPEARANCE: Well nourished, " well developed, in no acute distress.  PSYCH: Appropriate mood and affect.  EXTREMITIES: No edema.    Assessment/Plan:       ICD-10-CM ICD-9-CM    1. Missed menses  N92.6 626.4 POCT urine pregnancy      US OB/GYN Procedure ([facility]) - Extended List   2. Pregnancy, unspecified gestational age  Z34.90 V22.2 Urine culture      C. trachomatis/N. gonorrhoeae by AMP DNA [facility]; Urine      Ambulatory referral/consult to Psychology   3. Vaginal discharge  N89.8 623.5 Vaginosis Screen by DNA Probe         Counseling:     Reviewed pregnancy options with patient.  All questions answered.  R/B/A reviewed and discussed.  She is still deciding what she wants to do but will let us know.  Will go ahead and schedule follow up U/S. Also would like to get in to talk with someone.  Will place referral.  RTC in 2 weeks or sooner.

## 2022-06-07 ENCOUNTER — PATIENT MESSAGE (OUTPATIENT)
Dept: BEHAVIORAL HEALTH | Facility: CLINIC | Age: 27
End: 2022-06-07
Payer: MEDICAID

## 2022-06-07 LAB — BACTERIA UR CULT: NORMAL

## 2022-06-08 LAB
C TRACH DNA SPEC QL NAA+PROBE: NOT DETECTED
N GONORRHOEA DNA SPEC QL NAA+PROBE: NOT DETECTED

## 2022-07-08 ENCOUNTER — HOSPITAL ENCOUNTER (EMERGENCY)
Facility: OTHER | Age: 27
Discharge: HOME | End: 2022-07-08
Attending: EMERGENCY MEDICINE
Payer: MEDICAID

## 2022-07-08 VITALS
WEIGHT: 118 LBS | HEIGHT: 60 IN | HEART RATE: 83 BPM | DIASTOLIC BLOOD PRESSURE: 73 MMHG | SYSTOLIC BLOOD PRESSURE: 111 MMHG | TEMPERATURE: 99 F | BODY MASS INDEX: 23.16 KG/M2 | OXYGEN SATURATION: 99 % | RESPIRATION RATE: 16 BRPM

## 2022-07-08 DIAGNOSIS — O03.9 COMPLETE ABORTION: Primary | ICD-10-CM

## 2022-07-08 LAB
ABO + RH BLD: NORMAL
ALBUMIN SERPL BCP-MCNC: 4 G/DL (ref 3.5–5.2)
ALP SERPL-CCNC: 40 U/L (ref 55–135)
ALT SERPL W/O P-5'-P-CCNC: 16 U/L (ref 10–44)
ANION GAP SERPL CALC-SCNC: 11 MMOL/L (ref 8–16)
AST SERPL-CCNC: 17 U/L (ref 10–40)
B-HCG UR QL: POSITIVE
BACTERIA #/AREA URNS HPF: ABNORMAL /HPF
BASOPHILS # BLD AUTO: 0.07 K/UL (ref 0–0.2)
BASOPHILS NFR BLD: 0.7 % (ref 0–1.9)
BILIRUB SERPL-MCNC: 0.6 MG/DL (ref 0.1–1)
BILIRUB UR QL STRIP: NEGATIVE
BUN SERPL-MCNC: 8 MG/DL (ref 6–20)
CALCIUM SERPL-MCNC: 9.5 MG/DL (ref 8.7–10.5)
CHLORIDE SERPL-SCNC: 108 MMOL/L (ref 95–110)
CLARITY UR: ABNORMAL
CO2 SERPL-SCNC: 20 MMOL/L (ref 23–29)
COLOR UR: ABNORMAL
CREAT SERPL-MCNC: 0.7 MG/DL (ref 0.5–1.4)
CTP QC/QA: YES
DIFFERENTIAL METHOD: ABNORMAL
EOSINOPHIL # BLD AUTO: 0.3 K/UL (ref 0–0.5)
EOSINOPHIL NFR BLD: 3.1 % (ref 0–8)
ERYTHROCYTE [DISTWIDTH] IN BLOOD BY AUTOMATED COUNT: 13.5 % (ref 11.5–14.5)
EST. GFR  (AFRICAN AMERICAN): >60 ML/MIN/1.73 M^2
EST. GFR  (NON AFRICAN AMERICAN): >60 ML/MIN/1.73 M^2
GLUCOSE SERPL-MCNC: 113 MG/DL (ref 70–110)
GLUCOSE UR QL STRIP: NEGATIVE
HCG INTACT+B SERPL-ACNC: 3117 MIU/ML
HCT VFR BLD AUTO: 40.3 % (ref 37–48.5)
HGB BLD-MCNC: 13.3 G/DL (ref 12–16)
HGB UR QL STRIP: ABNORMAL
HYALINE CASTS #/AREA URNS LPF: 0 /LPF
IMM GRANULOCYTES # BLD AUTO: 0.02 K/UL (ref 0–0.04)
IMM GRANULOCYTES NFR BLD AUTO: 0.2 % (ref 0–0.5)
KETONES UR QL STRIP: ABNORMAL
LEUKOCYTE ESTERASE UR QL STRIP: ABNORMAL
LYMPHOCYTES # BLD AUTO: 2.4 K/UL (ref 1–4.8)
LYMPHOCYTES NFR BLD: 23.9 % (ref 18–48)
MCH RBC QN AUTO: 27.4 PG (ref 27–31)
MCHC RBC AUTO-ENTMCNC: 33 G/DL (ref 32–36)
MCV RBC AUTO: 83 FL (ref 82–98)
MICROSCOPIC COMMENT: ABNORMAL
MONOCYTES # BLD AUTO: 0.6 K/UL (ref 0.3–1)
MONOCYTES NFR BLD: 6.4 % (ref 4–15)
NEUTROPHILS # BLD AUTO: 6.5 K/UL (ref 1.8–7.7)
NEUTROPHILS NFR BLD: 65.7 % (ref 38–73)
NITRITE UR QL STRIP: NEGATIVE
NRBC BLD-RTO: 0 /100 WBC
PH UR STRIP: 6 [PH] (ref 5–8)
PLATELET # BLD AUTO: 488 K/UL (ref 150–450)
PMV BLD AUTO: 9.3 FL (ref 9.2–12.9)
POTASSIUM SERPL-SCNC: 3.7 MMOL/L (ref 3.5–5.1)
PROT SERPL-MCNC: 7.4 G/DL (ref 6–8.4)
PROT UR QL STRIP: ABNORMAL
RBC # BLD AUTO: 4.85 M/UL (ref 4–5.4)
RBC #/AREA URNS HPF: >100 /HPF (ref 0–4)
SODIUM SERPL-SCNC: 139 MMOL/L (ref 136–145)
SP GR UR STRIP: >=1.03 (ref 1–1.03)
SQUAMOUS #/AREA URNS HPF: 17 /HPF
URN SPEC COLLECT METH UR: ABNORMAL
UROBILINOGEN UR STRIP-ACNC: NEGATIVE EU/DL
WBC # BLD AUTO: 9.87 K/UL (ref 3.9–12.7)
WBC #/AREA URNS HPF: 11 /HPF (ref 0–5)

## 2022-07-08 PROCEDURE — 81000 URINALYSIS NONAUTO W/SCOPE: CPT | Performed by: EMERGENCY MEDICINE

## 2022-07-08 PROCEDURE — 86901 BLOOD TYPING SEROLOGIC RH(D): CPT | Performed by: NURSE PRACTITIONER

## 2022-07-08 PROCEDURE — 87086 URINE CULTURE/COLONY COUNT: CPT | Performed by: EMERGENCY MEDICINE

## 2022-07-08 PROCEDURE — 96374 THER/PROPH/DIAG INJ IV PUSH: CPT

## 2022-07-08 PROCEDURE — 63600175 PHARM REV CODE 636 W HCPCS: Performed by: NURSE PRACTITIONER

## 2022-07-08 PROCEDURE — 85025 COMPLETE CBC W/AUTO DIFF WBC: CPT | Performed by: NURSE PRACTITIONER

## 2022-07-08 PROCEDURE — 81025 URINE PREGNANCY TEST: CPT | Performed by: EMERGENCY MEDICINE

## 2022-07-08 PROCEDURE — 84702 CHORIONIC GONADOTROPIN TEST: CPT | Performed by: NURSE PRACTITIONER

## 2022-07-08 PROCEDURE — 99285 EMERGENCY DEPT VISIT HI MDM: CPT | Mod: 25

## 2022-07-08 PROCEDURE — 80053 COMPREHEN METABOLIC PANEL: CPT | Performed by: NURSE PRACTITIONER

## 2022-07-08 RX ORDER — KETOROLAC TROMETHAMINE 30 MG/ML
30 INJECTION, SOLUTION INTRAMUSCULAR; INTRAVENOUS
Status: COMPLETED | OUTPATIENT
Start: 2022-07-08 | End: 2022-07-08

## 2022-07-08 RX ORDER — MORPHINE SULFATE 4 MG/ML
4 INJECTION, SOLUTION INTRAMUSCULAR; INTRAVENOUS ONCE
Status: DISCONTINUED | OUTPATIENT
Start: 2022-07-08 | End: 2022-07-08

## 2022-07-08 RX ADMIN — KETOROLAC TROMETHAMINE 30 MG: 30 INJECTION, SOLUTION INTRAMUSCULAR; INTRAVENOUS at 10:07

## 2022-07-09 NOTE — ED PROVIDER NOTES
Source of History:  Patient    Chief complaint:  Vaginal Bleeding (Vaginal bleeding with lower region abdominal cramping x 6 days. Pt reports ~9 weeks pregnant and taking  pills on Saturday with increasing vaginal bleeding today)      HPI:  Paz Bentley is a 27 y.o. female presenting with c/o of vaginal bleeding and cramping that began on Saturday.  Patient reports that she was approximately 9 weeks pregnant and took a sided tech states she passed some tissue and multiple clots.  Reports cramping has improved she is still having bleeding in wanted to be evaluated.  She denies any fever/chills.    This is the extent to the patients complaints today here in the emergency department.    ROS: As per HPI and below:  Constitutional: No fever.  No chills.  Eyes: No visual changes.  ENT: No sore throat. No ear pain    Cardiovascular: No chest pain.  Respiratory: No shortness of breath.  GI:  Abdominal cramping  Genitourinary:  Vaginal bleeding  Neurologic: No headache. No focal weakness.  No numbness.  MSK: no back pain   Integument: No rashes or lesions.  Hematologic: No easy bruising.  Endocrine: No excessive thirst or urination.    Review of patient's allergies indicates:  No Known Allergies    PMH:  As per HPI and below:  No past medical history on file.  No past surgical history on file.    Social History     Tobacco Use    Smoking status: Never Smoker    Smokeless tobacco: Never Used   Substance Use Topics    Alcohol use: Yes     Comment: rare    Drug use: No       Physical Exam:    /73 (BP Location: Right arm, Patient Position: Sitting)   Pulse 83   Temp 98.6 °F (37 °C) (Oral)   Resp 16   Ht 5' (1.524 m)   Wt 53.5 kg (118 lb)   LMP 2022   SpO2 99%   BMI 23.05 kg/m²   Vitals:    22   BP: 111/73   Pulse: 83   Resp: 16   Temp: 98.6 °F (37 °C)   TempSrc: Oral   SpO2: 99%   Weight: 53.5 kg (118 lb)   Height: 5' (1.524 m)       Nurse's notes vitals reviewed  Constitutional:  Patient alert and oriented well-developed well-nourished  Eyes:  Normal conjunctiva.  Extraocular muscles are intact.  ENT: Oral mucosa moist  GI:  Nontender to palpation, bowel sounds are normal.  :  Vaginal bleeding noted  Musculoskeletal: Normocephalic atraumatic, normal range of motion, no obvious deformities  Skin: Warm and dry no rashes or lesions, no ecchymosis, no erythema  Neuro: alert and oriented x3,  no focal neurological deficits.  Psych: Appropriate, conversant    Labs Reviewed   URINALYSIS, REFLEX TO URINE CULTURE - Abnormal; Notable for the following components:       Result Value    Color, UA Red (*)     Appearance, UA Cloudy (*)     Specific Gravity, UA >=1.030 (*)     Protein, UA 1+ (*)     Ketones, UA 1+ (*)     Occult Blood UA 3+ (*)     Leukocytes, UA Trace (*)     All other components within normal limits    Narrative:     Specimen Source->Urine   CBC W/ AUTO DIFFERENTIAL - Abnormal; Notable for the following components:    Platelets 488 (*)     All other components within normal limits    Narrative:     Release to patient->Immediate   COMPREHENSIVE METABOLIC PANEL - Abnormal; Notable for the following components:    CO2 20 (*)     Glucose 113 (*)     Alkaline Phosphatase 40 (*)     All other components within normal limits    Narrative:     Release to patient->Immediate   URINALYSIS MICROSCOPIC - Abnormal; Notable for the following components:    RBC, UA >100 (*)     WBC, UA 11 (*)     Bacteria Few (*)     All other components within normal limits    Narrative:     Specimen Source->Urine   POCT URINE PREGNANCY - Abnormal; Notable for the following components:    POC Preg Test, Ur Positive (*)     All other components within normal limits   CULTURE, URINE   HCG, QUANTITATIVE    Narrative:     Release to patient->Immediate   GROUP & RH       US OB <14 Wks TransAbd & TransVag, Single Gestation (XPD)   Final Result      No IUP at this time.  Findings may represent a very early pregnancy,  ectopic pregnancy, or spontaneous AB.  Recommend follow-up serial beta HCG and ultrasound as needed.      Small right ovarian cyst with retracting clot.      Trace free pelvic fluid.         Electronically signed by: Kaye Fields   Date:    2022   Time:    21:52            Initial Impression/ Differential Dx:  Differential Diagnosis includes, but is not limited to:  Pregnancy complication (threatened AB, inevitable AB, incomplete Ab, missed AB, ectopic pregnancy, placenta previa) normal menses, STD, foreign body, trauma.    MDM:    27 y.o. female with vaginal bleeding abdominal cramping since Saturday after she took a sided tech.  Patient was proximally 9 weeks pregnant.  Ultrasound today reveals no IUP, this is likely a complete .  I spoke with OBGYN resident who states that he will send a message to Dr. Tello however scheduled for follow-up sometime in clinic next week.  Strict return precautions given to the patient including fever, severe abdominal pain or saturating 1 pad an hour she is to present back to emergency department for re-evaluation.  She stated understanding.    ED Course as of 22 Preg Test, Ur(!): Positive [AS]    WBC: 9.87 [AS]    Hemoglobin: 13.3 [AS]    Hematocrit: 40.3 [AS]      ED Course User Index  [AS] ALVA Kirkpatrick       Diagnostic Impression:    1. Complete          ED Disposition Condition    Discharge Stable          ED Prescriptions     None        Follow-up Information     Follow up With Specialties Details Why Contact Info    [facility] - Emergency Dept Emergency Medicine Go to  If symptoms worsen [address] [facility] [address]  [phone]           ALVA Kirkpatrick  24

## 2022-07-09 NOTE — ED NOTES
Pt c/o abd cramping and bleeding after taking  pill on Saturday. Pt states that she has taken ibuprofen and tylenol without relief

## 2022-07-11 LAB
BACTERIA UR CULT: NORMAL
BACTERIA UR CULT: NORMAL

## 2022-07-12 ENCOUNTER — TELEPHONE (OUTPATIENT)
Dept: OBSTETRICS AND GYNECOLOGY | Facility: CLINIC | Age: 27
End: 2022-07-12
Payer: MEDICAID

## 2022-07-12 NOTE — TELEPHONE ENCOUNTER
----- Message from Demarcus Prado MA sent at 7/11/2022  4:06 PM CDT -----  Please schedule patient for follow up from ED visit for within the next week. Thanks     VLADIMIR Bland MD   OBGYN PGY-4         Spoke with patient to schedule patient a follow up, but patient states she will call back.

## 2022-11-28 ENCOUNTER — TELEPHONE (OUTPATIENT)
Dept: OBSTETRICS AND GYNECOLOGY | Facility: CLINIC | Age: 27
End: 2022-11-28
Payer: MEDICAID

## 2022-11-28 DIAGNOSIS — Z34.90 PREGNANCY, UNSPECIFIED GESTATIONAL AGE: Primary | ICD-10-CM

## 2022-12-06 ENCOUNTER — TELEPHONE (OUTPATIENT)
Dept: OBSTETRICS AND GYNECOLOGY | Facility: CLINIC | Age: 27
End: 2022-12-06

## 2022-12-08 ENCOUNTER — HOSPITAL ENCOUNTER (EMERGENCY)
Facility: OTHER | Age: 27
Discharge: HOME | End: 2022-12-08
Attending: EMERGENCY MEDICINE
Payer: MEDICAID

## 2022-12-08 ENCOUNTER — TELEPHONE (OUTPATIENT)
Dept: OBSTETRICS AND GYNECOLOGY | Facility: CLINIC | Age: 27
End: 2022-12-08
Payer: MEDICAID

## 2022-12-08 VITALS
DIASTOLIC BLOOD PRESSURE: 80 MMHG | WEIGHT: 120 LBS | OXYGEN SATURATION: 100 % | HEART RATE: 73 BPM | SYSTOLIC BLOOD PRESSURE: 115 MMHG | BODY MASS INDEX: 24.19 KG/M2 | TEMPERATURE: 99 F | HEIGHT: 59 IN | RESPIRATION RATE: 18 BRPM

## 2022-12-08 DIAGNOSIS — Z32.01 POSITIVE PREGNANCY TEST: Primary | ICD-10-CM

## 2022-12-08 DIAGNOSIS — Z3A.01 LESS THAN 8 WEEKS GESTATION OF PREGNANCY: Primary | ICD-10-CM

## 2022-12-08 LAB
B-HCG UR QL: POSITIVE
BACTERIA #/AREA URNS HPF: ABNORMAL /HPF
BASOPHILS # BLD AUTO: 0.05 K/UL (ref 0–0.2)
BASOPHILS NFR BLD: 0.6 % (ref 0–1.9)
BILIRUB UR QL STRIP: NEGATIVE
CLARITY UR: ABNORMAL
COLOR UR: YELLOW
CTP QC/QA: YES
DIFFERENTIAL METHOD: ABNORMAL
EOSINOPHIL # BLD AUTO: 0.2 K/UL (ref 0–0.5)
EOSINOPHIL NFR BLD: 2.4 % (ref 0–8)
ERYTHROCYTE [DISTWIDTH] IN BLOOD BY AUTOMATED COUNT: 16.6 % (ref 11.5–14.5)
GLUCOSE UR QL STRIP: NEGATIVE
HCG INTACT+B SERPL-ACNC: NORMAL MIU/ML
HCT VFR BLD AUTO: 34.9 % (ref 37–48.5)
HCV AB SERPL QL IA: NEGATIVE
HGB BLD-MCNC: 11.6 G/DL (ref 12–16)
HGB UR QL STRIP: NEGATIVE
HIV 1+2 AB+HIV1 P24 AG SERPL QL IA: NEGATIVE
IMM GRANULOCYTES # BLD AUTO: 0.02 K/UL (ref 0–0.04)
IMM GRANULOCYTES NFR BLD AUTO: 0.2 % (ref 0–0.5)
KETONES UR QL STRIP: NEGATIVE
LEUKOCYTE ESTERASE UR QL STRIP: ABNORMAL
LYMPHOCYTES # BLD AUTO: 2.4 K/UL (ref 1–4.8)
LYMPHOCYTES NFR BLD: 27.7 % (ref 18–48)
MCH RBC QN AUTO: 26 PG (ref 27–31)
MCHC RBC AUTO-ENTMCNC: 33.2 G/DL (ref 32–36)
MCV RBC AUTO: 78 FL (ref 82–98)
MICROSCOPIC COMMENT: ABNORMAL
MONOCYTES # BLD AUTO: 0.8 K/UL (ref 0.3–1)
MONOCYTES NFR BLD: 9.5 % (ref 4–15)
NEUTROPHILS # BLD AUTO: 5.1 K/UL (ref 1.8–7.7)
NEUTROPHILS NFR BLD: 59.6 % (ref 38–73)
NITRITE UR QL STRIP: NEGATIVE
NRBC BLD-RTO: 0 /100 WBC
PH UR STRIP: 6 [PH] (ref 5–8)
PLATELET # BLD AUTO: 355 K/UL (ref 150–450)
PMV BLD AUTO: 9.1 FL (ref 9.2–12.9)
PROT UR QL STRIP: NEGATIVE
RBC # BLD AUTO: 4.46 M/UL (ref 4–5.4)
RBC #/AREA URNS HPF: 0 /HPF (ref 0–4)
SP GR UR STRIP: 1.02 (ref 1–1.03)
SQUAMOUS #/AREA URNS HPF: 12 /HPF
URN SPEC COLLECT METH UR: ABNORMAL
UROBILINOGEN UR STRIP-ACNC: NEGATIVE EU/DL
WBC # BLD AUTO: 8.51 K/UL (ref 3.9–12.7)
WBC #/AREA URNS HPF: 7 /HPF (ref 0–5)

## 2022-12-08 PROCEDURE — 85025 COMPLETE CBC W/AUTO DIFF WBC: CPT | Performed by: EMERGENCY MEDICINE

## 2022-12-08 PROCEDURE — 86803 HEPATITIS C AB TEST: CPT | Performed by: EMERGENCY MEDICINE

## 2022-12-08 PROCEDURE — 81025 URINE PREGNANCY TEST: CPT | Performed by: EMERGENCY MEDICINE

## 2022-12-08 PROCEDURE — 81000 URINALYSIS NONAUTO W/SCOPE: CPT | Performed by: EMERGENCY MEDICINE

## 2022-12-08 PROCEDURE — 87389 HIV-1 AG W/HIV-1&-2 AB AG IA: CPT | Performed by: EMERGENCY MEDICINE

## 2022-12-08 PROCEDURE — 99284 EMERGENCY DEPT VISIT MOD MDM: CPT | Mod: 25

## 2022-12-08 PROCEDURE — 84702 CHORIONIC GONADOTROPIN TEST: CPT | Performed by: EMERGENCY MEDICINE

## 2022-12-08 NOTE — TELEPHONE ENCOUNTER
Pt missed her pregnancy confirmation.  Her next appt is an initial appt with you on 12/23 with an US.  She mentioned she had intermittent spotting but that has resolved.  Reports light cramping but thinks she is dehydrated.  Now only have light brown d/c with an abnormal odor, but not fishy.  Denies itching or irritation.  LMP 10/19 ~7w 1d today.      ER precautions given.    Do you want me to schedule her for a preg confirmation with US with another provider?  And reschedule her appt with you on 12/23 to 4 weeks after?

## 2022-12-08 NOTE — TELEPHONE ENCOUNTER
Discussed with Dr. Petit.    I scheduled her for preg confirmation appt with US next week with Dr. Maynard.  Kept her appt with Dr. Tello on 12/23 with US.  Scheduled another appt with Dr. Tello 4 weeks after 12/23.

## 2022-12-09 ENCOUNTER — PATIENT OUTREACH (OUTPATIENT)
Dept: EMERGENCY MEDICINE | Facility: OTHER | Age: 27
End: 2022-12-09
Payer: MEDICAID

## 2022-12-09 NOTE — ED PROVIDER NOTES
Encounter Date: 2022       History     Chief Complaint   Patient presents with    Abdominal Pain    Vaginal Bleeding     Pt presented to ER with c/o abdominal cramping and vaginal spotting off and on x 2 weeks. Pt stated she is 7 weeks pregnant.      This is a 27-year-old  female approximately 7 wga who presents to the ED with complaints of intermittent vaginal spotting x2 weeks.  LMP 10/19/2022. Patient states that she was expecting her menstrual cycle to start mid November, however, noted that she had only some vaginal spotting which prompted her to take a pregnancy test which was positive.  She states that she has continued to have intermittent spotting since then.  She has not been using a pad or anything for the blood as it has not been significant.  She notes mild abdominal cramping earlier last week, however, none at this time.  States that the last time she had any vaginal spotting was 2 days ago.  Patient reached out to her Ob/gyn and made an appointment to be evaluated in a few days, however, she states that she is too anxious to wait until then and decided to come in to the ED to be evaluated.  Denies fever, chills, dysuria, vaginal discharge, N/V/D.    The history is provided by the patient.   Review of patient's allergies indicates:  No Known Allergies  No past medical history on file.  No past surgical history on file.  Family History   Problem Relation Age of Onset    Breast cancer Maternal Grandmother     Colon cancer Neg Hx     Ovarian cancer Neg Hx      Social History     Tobacco Use    Smoking status: Never    Smokeless tobacco: Never   Substance Use Topics    Alcohol use: Yes     Comment: rare    Drug use: No     Review of Systems   Constitutional:  Negative for chills and fever.   HENT:  Negative for congestion, sinus pressure and sore throat.    Eyes:  Negative for pain.   Respiratory:  Negative for cough and shortness of breath.    Cardiovascular:  Negative for chest pain.    Gastrointestinal:  Positive for abdominal pain. Negative for constipation, diarrhea, nausea and vomiting.   Genitourinary:  Positive for vaginal bleeding. Negative for dysuria, flank pain, hematuria and urgency.   Musculoskeletal:  Negative for arthralgias and myalgias.   Skin: Negative.    Neurological:  Negative for weakness, numbness and headaches.   Hematological: Negative.    Psychiatric/Behavioral:  Negative for agitation and confusion.      Physical Exam     Initial Vitals [12/08/22 1813]   BP Pulse Resp Temp SpO2   119/77 94 18 99.3 °F (37.4 °C) 100 %      MAP       --         Physical Exam    Constitutional: Vital signs are normal. She appears well-developed and well-nourished. She is cooperative.  Non-toxic appearance. She does not appear ill. No distress.   HENT:   Head: Normocephalic and atraumatic.   Eyes: Conjunctivae and lids are normal.   Neck: Trachea normal. No thyroid mass present.   Cardiovascular:  Normal rate and regular rhythm.           Abdominal: Abdomen is soft and flat. There is no abdominal tenderness. There is no rebound and no guarding.   Genitourinary:    Genitourinary Comments: Deferred pelvic exam due to ultrasound results showing IUP with gestational sac and lack of current vaginal bleeding       Neurological: She is alert and oriented to person, place, and time. GCS eye subscore is 4. GCS verbal subscore is 5. GCS motor subscore is 6.   Skin: Skin is warm, dry and intact. No rash noted.   Psychiatric: She has a normal mood and affect. Her speech is normal and behavior is normal. Thought content normal.       ED Course   Procedures  Labs Reviewed   CBC W/ AUTO DIFFERENTIAL - Abnormal; Notable for the following components:       Result Value    Hemoglobin 11.6 (*)     Hematocrit 34.9 (*)     MCV 78 (*)     MCH 26.0 (*)     RDW 16.6 (*)     MPV 9.1 (*)     All other components within normal limits    Narrative:     Release to patient->Immediate   URINALYSIS, REFLEX TO URINE CULTURE  - Abnormal; Notable for the following components:    Appearance, UA Hazy (*)     Leukocytes, UA Trace (*)     All other components within normal limits    Narrative:     Specimen Source->Urine   URINALYSIS MICROSCOPIC - Abnormal; Notable for the following components:    WBC, UA 7 (*)     Bacteria Few (*)     All other components within normal limits    Narrative:     Specimen Source->Urine   POCT URINE PREGNANCY - Abnormal; Notable for the following components:    POC Preg Test, Ur Positive (*)     All other components within normal limits   HIV 1 / 2 ANTIBODY    Narrative:     Release to patient->Immediate   HEPATITIS C ANTIBODY    Narrative:     Release to patient->Immediate   HCG, QUANTITATIVE    Narrative:     Release to patient->Immediate          Imaging Results              US OB <14 Wks TransAbd & TransVag, Single Gestation (XPD) (Final result)  Result time 12/08/22 20:50:22      Final result by Kaye Fields MD (12/08/22 20:50:22)                   Impression:      Early live IUP with crown-rump length measurements corresponding to a gestational age of 7 weeks 2 days with an estimated ultrasound due date of 07/25/2023.    Subchorionic hemorrhage.    Left corpus luteum.    Abnormal ovoid shape of the yolk sac.      Electronically signed by: Kaye Fields  Date:    12/08/2022  Time:    20:50               Narrative:    EXAMINATION:  ULTRASOUND OBSTETRICAL ULTRASOUND LESS THAN 14 WEEKS WITH TRANSVAGINAL    CLINICAL HISTORY:  spotting;    TECHNIQUE:  Real-time ultrasound obstetrical ultrasound less than 14 weeks was performed transabdominally and  transvaginally.    COMPARISON:  None.    FINDINGS:  The uterus measures 9.8 x 5.6 x 8.2 cm. There are no uterine masses. A fetal pole, yolk sac, and gestational sac are all visualized.  The fetal pole has a crown-rump length of 11.3 mm, which corresponds to a gestational age of 7 weeks 2 days.  The fetal heart rate is 138 beats per minute.  The yolk sac is  more ovoid in shape rather than the typical round shape.    There is a subchorionic hemorrhage measuring 1.4 x 0.8 x 1.3 cm.    The right ovary measures 2.8 x 1.8 x 2.0 cm. The left ovary measures 3.2 x 2.2 x 3.6 cm.  There is a left corpus luteum measuring 2.3 x 1.7 x 2.3 cm.    Small free fluid is seen in the posterior cul-de-sac.                                       Medications - No data to display  Medical Decision Making:   Initial Assessment:   Urgent evaluation of a 27-year-old pregnant female with intermittent vaginal spotting. Will obtain labs , ultrasound, bHCG level. Treat and reassess.    Differential Diagnosis:   Ectopic pregnancy, placental abruption, placenta previa, ruptured ovarian cyst, ovarian torsion, infection, foreign body, coagulation disorder, neoplasm, menstruation, drug effect.      ED Management:  Patient remains afebrile and nontoxic-appearing. She has had an ultrasound that shows an intrauterine pregnancy. HGB/HCT appropriate. RH + with no need of Rhogam. She will need to followup with her OB for continued care and reassessment. Has an appointment scheduled for 5 days from now. The pt understands. After taking into careful account the historical factors and physical exam findings of the patient's presentation today, in conjunction with the empirical and objective data obtained on ED workup, no acute emergent medical condition has been identified. The patient appears to be low risk for an emergent medical condition and I feel it is safe and appropriate at this time for the patient to be discharged to follow-up as detailed in their discharge instructions for reevaluation and possible continued outpatient workup and management. I have discussed the specifics of the workup with the patient and the patient has verbalized understanding of the details of the workup, the diagnosis, the treatment plan, and the need for outpatient follow-up.  Although the patient has no emergent etiology today  this does not preclude the development of an emergent condition so in addition, I have advised the patient that they can return to the ED and/or activate EMS at any time with worsening of their symptoms, change of their symptoms, or with any other medical complaint.  The patient remained comfortable and stable during their visit in the ED.  Discharge and follow-up instructions discussed with the patient who expressed understanding and willingness to comply with my recommendations.                           Clinical Impression:   Final diagnoses:  [Z3A.01] Less than 8 weeks gestation of pregnancy (Primary)        ED Disposition Condition    Discharge Stable          ED Prescriptions    None       Follow-up Information       Follow up With Specialties Details Why Contact Info    [facility] Emergency Medicine  If symptoms worsen [address] [facility] [id number] [phone]             Yael Briseno PA-C  12/08/22 5074

## 2022-12-09 NOTE — PROGRESS NOTES
Genesis Loyola LPN  ED Navigator  Emergency Department    [place] [facility] [place]  Role: Community Health Worker    Date: 12/09/2022  Patient Name: Paz Bentley  MRN: [id number]  PCP: Primary Doctor No    Assessment:     Paz Bentley is a 27 y.o. female who has presented to ED for Abdominal Pain, Vaginal Bleeding. Patient has visited the ED 1 times in the past 3 months. Patient did not contact PCP.     ED Navigator Initial Assessment    ED Navigator Enrollment Documentation  Consent to Services  Does patient consent to completing the assessment?: Yes  Contact  Method of Initial Contact: Phone  Transportation  Does the patient have issues with Transportation?: No  Does the patient have transportation to and from healthcare appointments?: Yes  Insurance Coverage  Do you have coverage/adequate coverage?: No  Reason for no/inadequate coverage: Uninsured (Comment: Pt stated Medicaid is no longer active she believes)  Specialist Appointment  Did the patient come to the ED to see a specialist?: Yes  Does the patient have a pending specialist referral?: No  Does the patient have a specialist appointment made?: Yes  Is the patient able to access a timely specialist appointment?: Yes  PCP Follow Up Appointment  Has the patient had an appointment with a primary care provider in the past year?: No  Does the patient have a follow up appontment with a PCP?: No  When was the last time you saw your PCP?: 12/9/20  Why does the patient not have a follow up scheduled?: Other (see comments) (Comment: Pt currently pregnant and following up routinely with OB)  Medications  Is patient able to afford medication?: Yes  Is patient unable to get medication due to lack of transportation?: No  Psychological  Does the patient have psycho-social concerns?: No  Food  Does the patient have concerns about food?: No  Communication/Education  Does the patient have limited English proficiency/English not primary language?: No  Does patient have low  literacy and/or low health literacy?: Yes  Does patient have concerns with care?: No  Does patient have dissatisfaction with care?: No  Other Financial Concerns  Does the patient have immediate financial distress?: No  Does the patient have general financial concerns?: Yes  Other Social Barriers/Concerns  Does the patient have any additional barriers or concerns?: Housing concerns (Comment: Pt would like rental assistance information)  Primary Barrier  Barriers identified: Financial barrier (health insurance, employment, etc.), Cognitive barrier (health literacy, language and communication, etc.)  Root Cause of ED Utilization: Lack of Access to Primary Care  Plan to address Lack of Access to Primary Care: Provided patient with information about the [facility] in their area, Provided information for [facility] ([facility], [place], etc.), Provided information for [facility] 24/7 Nurse Triage line [phone] or 1-866-[facility] ([phone]), Provided [facility] PCP assistance line [phone]  Next steps: Provided Education, Scheduled Appointment/Referral  Scheduled Appointment Date: 1/19/23  Appointment Reminder Date: 1/17/23  Was education/educational materials provided surrounding PCP services/creating a medical home?: Yes Was education verbal or written?: Written     Was education/educational materials provided surrounding low cost, healthy foods?: Yes Was education verbal or written?: Written     Was education/educational materials provided surrounding other items? If so, use comment to explain.: Yes (Comment: How to apply for medicaid, rental assistance resources) Was education verbal or written?: Written   Plan: Provided information for [facility] 24/7 Nurse triage line, [phone] or 1-[facility] ([phone])  Expected Date of Follow Up 1: 1/17/23  Additional Documentation: Spoke with patient  today and she was agreeable to enrollment and subsequent F/U calls. Pt. Denies any psychosocial needs at this time. Pt. Has upcoming appointments with OBGYN and US scheduled already. Pt. Denies smoking. Pt. Would like resources for rental assistance and also information on how to apply for Medicaid. Pt stated she is a  and is not sure if she will qualify for assistance due to her salary, but she believes that her Medicaid is no longer active at this time. Right Care Right Place form, [place] Virtual Visit Flyer, [facility] PCP scheduling assistance, [facility] on call RN#, and Heart Healthy Diet education all sent to e-mail.         Social History     Socioeconomic History    Marital status: Single   Tobacco Use    Smoking status: Never    Smokeless tobacco: Never   Substance and Sexual Activity    Alcohol use: Yes     Comment: rare    Drug use: No    Sexual activity: Yes     Partners: Male     Birth control/protection: None     Social Determinants of Health     Financial Resource Strain: Medium Risk    Difficulty of Paying Living Expenses: Somewhat hard   Food Insecurity: No Food Insecurity    Worried About Running Out of Food in the Last Year: Never true    Ran Out of Food in the Last Year: Never true   Transportation Needs: No Transportation Needs    Lack of Transportation (Medical): No    Lack of Transportation (Non-Medical): No   Stress: No Stress Concern Present    Feeling of Stress : Not at all   Social Connections: Unknown    Marital Status: Never    Housing Stability: Unknown    Unable to Pay for Housing in the Last Year: No    Unstable Housing in the Last Year: No       Plan:   Spoke with patient today and she was agreeable to enrollment and subsequent F/U calls. Pt. Denies any psychosocial needs at this time. Pt. Has upcoming appointments with OBGYN and US scheduled already. Pt. Denies smoking. Pt. Would like resources for rental assistance and also information on how to apply for Medicaid. Pt  stated she is a  and is not sure if she will qualify for assistance due to her salary, but she believes that her Medicaid is no longer active at this time. Right Care Right Place form, [place] Virtual Visit Flyer, [facility] PCP scheduling assistance, [facility] on call RN#, and Heart Healthy Diet education all sent to e-mail.

## 2022-12-09 NOTE — DISCHARGE INSTRUCTIONS
Follow up with your OB/gyn.     Return to the ER if you have any further vaginal bleeding, abdominal pain/cramping, or other worrisome symptoms such as fever, chills, or weakness.

## 2022-12-13 ENCOUNTER — OFFICE VISIT (OUTPATIENT)
Dept: OBSTETRICS AND GYNECOLOGY | Facility: CLINIC | Age: 27
End: 2022-12-13
Attending: STUDENT IN AN ORGANIZED HEALTH CARE EDUCATION/TRAINING PROGRAM
Payer: MEDICAID

## 2022-12-13 ENCOUNTER — PATIENT MESSAGE (OUTPATIENT)
Dept: OBSTETRICS AND GYNECOLOGY | Facility: CLINIC | Age: 27
End: 2022-12-13

## 2022-12-13 ENCOUNTER — LAB VISIT (OUTPATIENT)
Dept: LAB | Facility: OTHER | Age: 27
End: 2022-12-13
Attending: STUDENT IN AN ORGANIZED HEALTH CARE EDUCATION/TRAINING PROGRAM
Payer: MEDICAID

## 2022-12-13 VITALS
HEIGHT: 59 IN | WEIGHT: 118.19 LBS | DIASTOLIC BLOOD PRESSURE: 73 MMHG | BODY MASS INDEX: 23.83 KG/M2 | SYSTOLIC BLOOD PRESSURE: 105 MMHG

## 2022-12-13 DIAGNOSIS — N92.6 MISSED MENSES: ICD-10-CM

## 2022-12-13 DIAGNOSIS — N89.8 VAGINAL DISCHARGE: ICD-10-CM

## 2022-12-13 DIAGNOSIS — Z32.01 POSITIVE PREGNANCY TEST: ICD-10-CM

## 2022-12-13 DIAGNOSIS — N92.6 MISSED MENSES: Primary | ICD-10-CM

## 2022-12-13 PROBLEM — Z11.8 SCREENING FOR OTHER SPECIFIC VIRAL AND CHLAMYDIAL DISEASES: Status: ACTIVE | Noted: 2021-07-03

## 2022-12-13 PROBLEM — Z20.822 CONTACT WITH AND (SUSPECTED) EXPOSURE TO COVID-19: Status: ACTIVE | Noted: 2021-07-06

## 2022-12-13 PROBLEM — Z11.59 SCREENING FOR OTHER SPECIFIC VIRAL AND CHLAMYDIAL DISEASES: Status: ACTIVE | Noted: 2021-07-03

## 2022-12-13 LAB
ABO + RH BLD: NORMAL
BASOPHILS # BLD AUTO: 0.05 K/UL (ref 0–0.2)
BASOPHILS NFR BLD: 0.7 % (ref 0–1.9)
BLD GP AB SCN CELLS X3 SERPL QL: NORMAL
C TRACH DNA SPEC QL NAA+PROBE: NOT DETECTED
DIFFERENTIAL METHOD: ABNORMAL
EOSINOPHIL # BLD AUTO: 0.1 K/UL (ref 0–0.5)
EOSINOPHIL NFR BLD: 1.7 % (ref 0–8)
ERYTHROCYTE [DISTWIDTH] IN BLOOD BY AUTOMATED COUNT: 16.7 % (ref 11.5–14.5)
HBV SURFACE AG SERPL QL IA: NORMAL
HCT VFR BLD AUTO: 36.4 % (ref 37–48.5)
HCV AB SERPL QL IA: NORMAL
HGB BLD-MCNC: 12 G/DL (ref 12–16)
HIV 1+2 AB+HIV1 P24 AG SERPL QL IA: NORMAL
IMM GRANULOCYTES # BLD AUTO: 0.02 K/UL (ref 0–0.04)
IMM GRANULOCYTES NFR BLD AUTO: 0.3 % (ref 0–0.5)
LYMPHOCYTES # BLD AUTO: 1.6 K/UL (ref 1–4.8)
LYMPHOCYTES NFR BLD: 20.8 % (ref 18–48)
MCH RBC QN AUTO: 26 PG (ref 27–31)
MCHC RBC AUTO-ENTMCNC: 33 G/DL (ref 32–36)
MCV RBC AUTO: 79 FL (ref 82–98)
MONOCYTES # BLD AUTO: 0.6 K/UL (ref 0.3–1)
MONOCYTES NFR BLD: 8.3 % (ref 4–15)
N GONORRHOEA DNA SPEC QL NAA+PROBE: NOT DETECTED
NEUTROPHILS # BLD AUTO: 5.1 K/UL (ref 1.8–7.7)
NEUTROPHILS NFR BLD: 68.2 % (ref 38–73)
NRBC BLD-RTO: 0 /100 WBC
PLATELET # BLD AUTO: 395 K/UL (ref 150–450)
PMV BLD AUTO: 9.1 FL (ref 9.2–12.9)
RBC # BLD AUTO: 4.61 M/UL (ref 4–5.4)
WBC # BLD AUTO: 7.49 K/UL (ref 3.9–12.7)

## 2022-12-13 PROCEDURE — 86762 RUBELLA ANTIBODY: CPT | Performed by: STUDENT IN AN ORGANIZED HEALTH CARE EDUCATION/TRAINING PROGRAM

## 2022-12-13 PROCEDURE — 99213 OFFICE O/P EST LOW 20 MIN: CPT | Mod: PBBFAC,25 | Performed by: STUDENT IN AN ORGANIZED HEALTH CARE EDUCATION/TRAINING PROGRAM

## 2022-12-13 PROCEDURE — 87591 N.GONORRHOEAE DNA AMP PROB: CPT | Performed by: STUDENT IN AN ORGANIZED HEALTH CARE EDUCATION/TRAINING PROGRAM

## 2022-12-13 PROCEDURE — 99213 OFFICE O/P EST LOW 20 MIN: CPT | Mod: S$PBB,,, | Performed by: STUDENT IN AN ORGANIZED HEALTH CARE EDUCATION/TRAINING PROGRAM

## 2022-12-13 PROCEDURE — 3078F PR MOST RECENT DIASTOLIC BLOOD PRESSURE < 80 MM HG: ICD-10-PCS | Mod: CPTII,,, | Performed by: STUDENT IN AN ORGANIZED HEALTH CARE EDUCATION/TRAINING PROGRAM

## 2022-12-13 PROCEDURE — 85025 COMPLETE CBC W/AUTO DIFF WBC: CPT | Performed by: STUDENT IN AN ORGANIZED HEALTH CARE EDUCATION/TRAINING PROGRAM

## 2022-12-13 PROCEDURE — 1160F RVW MEDS BY RX/DR IN RCRD: CPT | Mod: CPTII,,, | Performed by: STUDENT IN AN ORGANIZED HEALTH CARE EDUCATION/TRAINING PROGRAM

## 2022-12-13 PROCEDURE — 76801 US OB/GYN EXTENDED PROCEDURE (VIEWPOINT): ICD-10-PCS | Mod: 26,S$PBB,, | Performed by: OBSTETRICS & GYNECOLOGY

## 2022-12-13 PROCEDURE — 1159F PR MEDICATION LIST DOCUMENTED IN MEDICAL RECORD: ICD-10-PCS | Mod: CPTII,,, | Performed by: STUDENT IN AN ORGANIZED HEALTH CARE EDUCATION/TRAINING PROGRAM

## 2022-12-13 PROCEDURE — 81514 NFCT DS BV&VAGINITIS DNA ALG: CPT | Performed by: STUDENT IN AN ORGANIZED HEALTH CARE EDUCATION/TRAINING PROGRAM

## 2022-12-13 PROCEDURE — 86803 HEPATITIS C AB TEST: CPT | Performed by: STUDENT IN AN ORGANIZED HEALTH CARE EDUCATION/TRAINING PROGRAM

## 2022-12-13 PROCEDURE — 3074F SYST BP LT 130 MM HG: CPT | Mod: CPTII,,, | Performed by: STUDENT IN AN ORGANIZED HEALTH CARE EDUCATION/TRAINING PROGRAM

## 2022-12-13 PROCEDURE — 87340 HEPATITIS B SURFACE AG IA: CPT | Performed by: STUDENT IN AN ORGANIZED HEALTH CARE EDUCATION/TRAINING PROGRAM

## 2022-12-13 PROCEDURE — 1160F PR REVIEW ALL MEDS BY PRESCRIBER/CLIN PHARMACIST DOCUMENTED: ICD-10-PCS | Mod: CPTII,,, | Performed by: STUDENT IN AN ORGANIZED HEALTH CARE EDUCATION/TRAINING PROGRAM

## 2022-12-13 PROCEDURE — 99213 PR OFFICE/OUTPT VISIT, EST, LEVL III, 20-29 MIN: ICD-10-PCS | Mod: S$PBB,,, | Performed by: STUDENT IN AN ORGANIZED HEALTH CARE EDUCATION/TRAINING PROGRAM

## 2022-12-13 PROCEDURE — 99999 PR PBB SHADOW E&M-EST. PATIENT-LVL III: CPT | Mod: PBBFAC,,, | Performed by: STUDENT IN AN ORGANIZED HEALTH CARE EDUCATION/TRAINING PROGRAM

## 2022-12-13 PROCEDURE — 3074F PR MOST RECENT SYSTOLIC BLOOD PRESSURE < 130 MM HG: ICD-10-PCS | Mod: CPTII,,, | Performed by: STUDENT IN AN ORGANIZED HEALTH CARE EDUCATION/TRAINING PROGRAM

## 2022-12-13 PROCEDURE — 3008F PR BODY MASS INDEX (BMI) DOCUMENTED: ICD-10-PCS | Mod: CPTII,,, | Performed by: STUDENT IN AN ORGANIZED HEALTH CARE EDUCATION/TRAINING PROGRAM

## 2022-12-13 PROCEDURE — 99999 PR PBB SHADOW E&M-EST. PATIENT-LVL III: ICD-10-PCS | Mod: PBBFAC,,, | Performed by: STUDENT IN AN ORGANIZED HEALTH CARE EDUCATION/TRAINING PROGRAM

## 2022-12-13 PROCEDURE — 87491 CHLMYD TRACH DNA AMP PROBE: CPT | Performed by: STUDENT IN AN ORGANIZED HEALTH CARE EDUCATION/TRAINING PROGRAM

## 2022-12-13 PROCEDURE — 1159F MED LIST DOCD IN RCRD: CPT | Mod: CPTII,,, | Performed by: STUDENT IN AN ORGANIZED HEALTH CARE EDUCATION/TRAINING PROGRAM

## 2022-12-13 PROCEDURE — 87086 URINE CULTURE/COLONY COUNT: CPT | Performed by: STUDENT IN AN ORGANIZED HEALTH CARE EDUCATION/TRAINING PROGRAM

## 2022-12-13 PROCEDURE — 36415 COLL VENOUS BLD VENIPUNCTURE: CPT | Performed by: STUDENT IN AN ORGANIZED HEALTH CARE EDUCATION/TRAINING PROGRAM

## 2022-12-13 PROCEDURE — 86850 RBC ANTIBODY SCREEN: CPT | Performed by: STUDENT IN AN ORGANIZED HEALTH CARE EDUCATION/TRAINING PROGRAM

## 2022-12-13 PROCEDURE — 87389 HIV-1 AG W/HIV-1&-2 AB AG IA: CPT | Performed by: STUDENT IN AN ORGANIZED HEALTH CARE EDUCATION/TRAINING PROGRAM

## 2022-12-13 PROCEDURE — 86592 SYPHILIS TEST NON-TREP QUAL: CPT | Performed by: STUDENT IN AN ORGANIZED HEALTH CARE EDUCATION/TRAINING PROGRAM

## 2022-12-13 PROCEDURE — 76801 OB US < 14 WKS SINGLE FETUS: CPT | Mod: PBBFAC | Performed by: OBSTETRICS & GYNECOLOGY

## 2022-12-13 PROCEDURE — 3008F BODY MASS INDEX DOCD: CPT | Mod: CPTII,,, | Performed by: STUDENT IN AN ORGANIZED HEALTH CARE EDUCATION/TRAINING PROGRAM

## 2022-12-13 PROCEDURE — 3078F DIAST BP <80 MM HG: CPT | Mod: CPTII,,, | Performed by: STUDENT IN AN ORGANIZED HEALTH CARE EDUCATION/TRAINING PROGRAM

## 2022-12-13 NOTE — PROGRESS NOTES
"Chief Complaint: Absence of Menses     HPI:      Paz Bentley is a 27 y.o.  who presents complaining of absence of menses.  She reports fatigue and mild nausea. Also concerned she may have BV based on vaginal discharge. Denies abdominal pain, vaginal bleeding, /GI sxs. Patient's last menstrual period was 10/19/2022.      Pregnancy History:    - EAB x 1, no D&C    GYNHx:  History of HSV - she has not had an outbreak in years, also has not told family/friends  Denies h/o other STDs  Denies h/o abnormal paps    History reviewed. No pertinent past medical history.  History reviewed. No pertinent surgical history.  Social History     Tobacco Use    Smoking status: Never    Smokeless tobacco: Never   Substance Use Topics    Alcohol use: Yes     Comment: rare    Drug use: No     Family History   Problem Relation Age of Onset    Breast cancer Maternal Grandmother     Colon cancer Neg Hx     Ovarian cancer Neg Hx      OB History    Para Term  AB Living   2 0 0 0 1 0   SAB IAB Ectopic Multiple Live Births   0 0 0 0 0      # Outcome Date GA Lbr Ziggy/2nd Weight Sex Delivery Anes PTL Lv   2 Current            1 AB               Obstetric Comments   Menarche at 14       Physical Exam:      PHYSICAL EXAM:  /73   Ht 4' 11" (1.499 m)   Wt 53.6 kg (118 lb 2.7 oz)   LMP 10/19/2022   BMI 23.87 kg/m²   Body mass index is 23.87 kg/m².     APPEARANCE: Well nourished, well developed, in no acute distress.  PELVIC: normal external genitalia, vagina pink and rugated with no lesions, no blood in vault, scant thin white discharge in vault, cervix smooth and pink with no lesions     Assessment/Plan:     Paz was seen today for possible pregnancy.    Diagnoses and all orders for this visit:    Missed menses  -     Hepatitis C Antibody; Future  -     HIV-1 and HIV-2 antibodies; Future  -     RPR; Future  -     Hepatitis B surface antigen; Future  -     Type & Screen; Future  -     Rubella antibody, IgG; " Future  -     Urine culture  -     CBC auto differential; Future  -     C. trachomatis/N. gonorrhoeae by [facility]; Urine    Vaginal discharge  -     VAGINOSIS SCREEN BY DNA PROBE    - pelvic exam as above, affirm collected  - prenatal labs today  - counseling as below, pregnancy pamphlet provided  - HOME 23 based on today's US, +FHTs  - discussed genetic screening options - interested in [id number], pamphlet provided to assess cost and will inform us if she desires  - RTC as scheduled with Dr Costa     Counselin. Patient was counseled today on proper weight gain based on the [place] [employer] [facility]'s recommendations based on her pre-pregnancy weight.   2. Discussed dietary recommendations.  3. Encouraged compliance with prenatal vitamins.  4. Optional genetic testing discussed.   5. Safety of exercise discussed with patient, and continued active lifestyle encouraged.  6. COVID-19 virus precautions for both patient and her spouse discussed, and available data on COVID vaccination reviewed.  7. Normal course of prenatal care reviewed.  8. Medications safe in pregnancy discussed and list provided.    Use of the [facility] Patient Portal discussed and encouraged during today's visit.     Yvette Maynard MD  Obstetrics and Gynecology  [facility]

## 2022-12-14 LAB
BACTERIA UR CULT: NO GROWTH
RPR SER QL: NORMAL
RUBV IGG SER-ACNC: 76.6 IU/ML
RUBV IGG SER-IMP: REACTIVE

## 2022-12-15 ENCOUNTER — PATIENT MESSAGE (OUTPATIENT)
Dept: OBSTETRICS AND GYNECOLOGY | Facility: CLINIC | Age: 27
End: 2022-12-15
Payer: MEDICAID

## 2022-12-15 DIAGNOSIS — B96.89 BV (BACTERIAL VAGINOSIS): Primary | ICD-10-CM

## 2022-12-15 DIAGNOSIS — B37.31 YEAST VAGINITIS: ICD-10-CM

## 2022-12-15 DIAGNOSIS — N76.0 BV (BACTERIAL VAGINOSIS): Primary | ICD-10-CM

## 2022-12-15 RX ORDER — METRONIDAZOLE 7.5 MG/G
1 GEL VAGINAL NIGHTLY
Qty: 70 G | Refills: 0 | Status: SHIPPED | OUTPATIENT
Start: 2022-12-15 | End: 2022-12-23

## 2022-12-15 RX ORDER — MICONAZOLE NITRATE 2 %
1 CREAM WITH APPLICATOR VAGINAL DAILY
Qty: 45 G | Refills: 0 | Status: SHIPPED | OUTPATIENT
Start: 2022-12-15 | End: 2022-12-23

## 2022-12-23 ENCOUNTER — PROCEDURE VISIT (OUTPATIENT)
Dept: OBSTETRICS AND GYNECOLOGY | Facility: CLINIC | Age: 27
End: 2022-12-23
Payer: MEDICAID

## 2022-12-23 ENCOUNTER — INITIAL PRENATAL (OUTPATIENT)
Dept: OBSTETRICS AND GYNECOLOGY | Facility: CLINIC | Age: 27
End: 2022-12-23
Attending: STUDENT IN AN ORGANIZED HEALTH CARE EDUCATION/TRAINING PROGRAM
Payer: MEDICAID

## 2022-12-23 VITALS — DIASTOLIC BLOOD PRESSURE: 64 MMHG | SYSTOLIC BLOOD PRESSURE: 108 MMHG

## 2022-12-23 DIAGNOSIS — Z34.90 PREGNANCY, UNSPECIFIED GESTATIONAL AGE: ICD-10-CM

## 2022-12-23 DIAGNOSIS — Z3A.09 9 WEEKS GESTATION OF PREGNANCY: Primary | ICD-10-CM

## 2022-12-23 PROCEDURE — 99213 OFFICE O/P EST LOW 20 MIN: CPT | Mod: TH,S$PBB,, | Performed by: STUDENT IN AN ORGANIZED HEALTH CARE EDUCATION/TRAINING PROGRAM

## 2022-12-23 PROCEDURE — 99212 OFFICE O/P EST SF 10 MIN: CPT | Mod: PBBFAC,25 | Performed by: STUDENT IN AN ORGANIZED HEALTH CARE EDUCATION/TRAINING PROGRAM

## 2022-12-23 PROCEDURE — 76801 US OB/GYN EXTENDED PROCEDURE (VIEWPOINT): ICD-10-PCS | Mod: 26,S$PBB,, | Performed by: OBSTETRICS & GYNECOLOGY

## 2022-12-23 PROCEDURE — 99213 PR OFFICE/OUTPT VISIT, EST, LEVL III, 20-29 MIN: ICD-10-PCS | Mod: TH,S$PBB,, | Performed by: STUDENT IN AN ORGANIZED HEALTH CARE EDUCATION/TRAINING PROGRAM

## 2022-12-23 PROCEDURE — 76801 OB US < 14 WKS SINGLE FETUS: CPT | Mod: 26,S$PBB,, | Performed by: OBSTETRICS & GYNECOLOGY

## 2022-12-23 PROCEDURE — 99999 PR PBB SHADOW E&M-EST. PATIENT-LVL II: ICD-10-PCS | Mod: PBBFAC,,, | Performed by: STUDENT IN AN ORGANIZED HEALTH CARE EDUCATION/TRAINING PROGRAM

## 2022-12-23 PROCEDURE — 76817 US OB/GYN EXTENDED PROCEDURE (VIEWPOINT): ICD-10-PCS | Mod: 26,S$PBB,, | Performed by: OBSTETRICS & GYNECOLOGY

## 2022-12-23 PROCEDURE — 76817 TRANSVAGINAL US OBSTETRIC: CPT | Mod: PBBFAC | Performed by: OBSTETRICS & GYNECOLOGY

## 2022-12-23 PROCEDURE — 99999 PR PBB SHADOW E&M-EST. PATIENT-LVL II: CPT | Mod: PBBFAC,,, | Performed by: STUDENT IN AN ORGANIZED HEALTH CARE EDUCATION/TRAINING PROGRAM

## 2022-12-23 RX ORDER — TERCONAZOLE 4 MG/G
1 CREAM VAGINAL NIGHTLY
Qty: 45 G | Refills: 0 | Status: SHIPPED | OUTPATIENT
Start: 2022-12-23 | End: 2022-12-30 | Stop reason: SDUPTHER

## 2022-12-29 ENCOUNTER — PATIENT MESSAGE (OUTPATIENT)
Dept: OBSTETRICS AND GYNECOLOGY | Facility: CLINIC | Age: 27
End: 2022-12-29
Payer: MEDICAID

## 2022-12-29 DIAGNOSIS — N76.0 ACUTE VAGINITIS: Primary | ICD-10-CM

## 2022-12-30 RX ORDER — TERCONAZOLE 4 MG/G
1 CREAM VAGINAL NIGHTLY
Qty: 45 G | Refills: 0 | Status: SHIPPED | OUTPATIENT
Start: 2022-12-30 | End: 2023-01-06

## 2023-01-07 NOTE — PROGRESS NOTES
Doing well.  No complains.  Discussed baby asa.  Also reviewed genetic screening options.  Hemoglobin electrophoresis.  All questions answered.  rtc in 4 wks or sooner if needed.

## 2023-01-17 ENCOUNTER — PATIENT OUTREACH (OUTPATIENT)
Dept: EMERGENCY MEDICINE | Facility: OTHER | Age: 28
End: 2023-01-17
Payer: MEDICAID

## 2023-01-17 NOTE — PROGRESS NOTES
Tried calling patient for appointment reminder but did not get an answer. Sent appointment reminder via e-mail for appointment on 1/19/23 at 9:00 a.m. with Dr. Maia Yost. Instructed pt to arrive 15 minutes early for check in, bring photo ID, and insurance cards.

## 2023-01-17 NOTE — PROGRESS NOTES
Tried calling patient initially for first F/U and did not get an answer. Sent a F/U e-mail for follow up instructing the patient to call with any future needs/concerns. Also resent MCIP documents.

## 2023-01-19 ENCOUNTER — ROUTINE PRENATAL (OUTPATIENT)
Dept: OBSTETRICS AND GYNECOLOGY | Facility: CLINIC | Age: 28
End: 2023-01-19
Attending: STUDENT IN AN ORGANIZED HEALTH CARE EDUCATION/TRAINING PROGRAM
Payer: MEDICAID

## 2023-01-19 VITALS
WEIGHT: 119.06 LBS | SYSTOLIC BLOOD PRESSURE: 110 MMHG | DIASTOLIC BLOOD PRESSURE: 66 MMHG | BODY MASS INDEX: 24.04 KG/M2

## 2023-01-19 DIAGNOSIS — Z36.89 ENCOUNTER FOR FETAL ANATOMIC SURVEY: ICD-10-CM

## 2023-01-19 DIAGNOSIS — N89.8 VAGINAL IRRITATION: Primary | ICD-10-CM

## 2023-01-19 PROCEDURE — 81514 NFCT DS BV&VAGINITIS DNA ALG: CPT | Performed by: STUDENT IN AN ORGANIZED HEALTH CARE EDUCATION/TRAINING PROGRAM

## 2023-01-19 PROCEDURE — 99999 PR PBB SHADOW E&M-EST. PATIENT-LVL II: CPT | Mod: PBBFAC,,, | Performed by: STUDENT IN AN ORGANIZED HEALTH CARE EDUCATION/TRAINING PROGRAM

## 2023-01-19 PROCEDURE — 99212 OFFICE O/P EST SF 10 MIN: CPT | Mod: PBBFAC,TH | Performed by: STUDENT IN AN ORGANIZED HEALTH CARE EDUCATION/TRAINING PROGRAM

## 2023-01-19 PROCEDURE — 99999 PR PBB SHADOW E&M-EST. PATIENT-LVL II: ICD-10-PCS | Mod: PBBFAC,,, | Performed by: STUDENT IN AN ORGANIZED HEALTH CARE EDUCATION/TRAINING PROGRAM

## 2023-01-19 PROCEDURE — 99213 OFFICE O/P EST LOW 20 MIN: CPT | Mod: TH,S$PBB,, | Performed by: STUDENT IN AN ORGANIZED HEALTH CARE EDUCATION/TRAINING PROGRAM

## 2023-01-19 PROCEDURE — 99213 PR OFFICE/OUTPT VISIT, EST, LEVL III, 20-29 MIN: ICD-10-PCS | Mod: TH,S$PBB,, | Performed by: STUDENT IN AN ORGANIZED HEALTH CARE EDUCATION/TRAINING PROGRAM

## 2023-01-19 NOTE — PROGRESS NOTES
Doing well.  Some vaginal discharge.  No irritation or itching.  No other issues or concerns.  sse with white physiologic discharge, cervix c/t/h.  Swab collected and sent.  Declines genetics.  Needs anatomy  rtc in 4 weeks or sooner if needed.

## 2023-01-20 LAB
BACTERIAL VAGINOSIS DNA: NEGATIVE
CANDIDA GLABRATA DNA: NEGATIVE
CANDIDA KRUSEI DNA: NEGATIVE
CANDIDA RRNA VAG QL PROBE: POSITIVE
T VAGINALIS RRNA GENITAL QL PROBE: NEGATIVE

## 2023-01-23 ENCOUNTER — PATIENT MESSAGE (OUTPATIENT)
Dept: OBSTETRICS AND GYNECOLOGY | Facility: CLINIC | Age: 28
End: 2023-01-23
Payer: MEDICAID

## 2023-01-23 RX ORDER — FLUCONAZOLE 150 MG/1
150 TABLET ORAL ONCE
Qty: 2 TABLET | Refills: 1 | Status: SHIPPED | OUTPATIENT
Start: 2023-01-23 | End: 2023-01-23

## 2023-01-24 ENCOUNTER — TELEPHONE (OUTPATIENT)
Dept: OBSTETRICS AND GYNECOLOGY | Facility: CLINIC | Age: 28
End: 2023-01-24
Payer: MEDICAID

## 2023-01-24 NOTE — TELEPHONE ENCOUNTER
14 week OB c/o nasal congestion, ear pain, body aches and lost taste and smell.  Recommended she go to urgent care and gave recommendation from approved OB med list.

## 2023-01-24 NOTE — TELEPHONE ENCOUNTER
14 week OB went to urgent care, tested positive for COVID.  Discussed meds safe in pregnancy, rest and hydration.  ED precautions also discussed.  Will message on the portal with any questions.

## 2023-02-03 ENCOUNTER — PATIENT MESSAGE (OUTPATIENT)
Dept: OBSTETRICS AND GYNECOLOGY | Facility: CLINIC | Age: 28
End: 2023-02-03
Payer: MEDICAID

## 2023-02-07 ENCOUNTER — PATIENT MESSAGE (OUTPATIENT)
Dept: OTHER | Facility: OTHER | Age: 28
End: 2023-02-07
Payer: MEDICAID

## 2023-02-14 ENCOUNTER — PATIENT MESSAGE (OUTPATIENT)
Dept: OTHER | Facility: OTHER | Age: 28
End: 2023-02-14
Payer: MEDICAID

## 2023-02-25 ENCOUNTER — PATIENT MESSAGE (OUTPATIENT)
Dept: OBSTETRICS AND GYNECOLOGY | Facility: CLINIC | Age: 28
End: 2023-02-25
Payer: COMMERCIAL

## 2023-02-27 RX ORDER — FLUCONAZOLE 150 MG/1
150 TABLET ORAL
COMMUNITY
Start: 2023-01-24 | End: 2023-02-27 | Stop reason: SDUPTHER

## 2023-02-27 RX ORDER — FLUCONAZOLE 150 MG/1
150 TABLET ORAL ONCE
Qty: 1 TABLET | Refills: 0 | Status: SHIPPED | OUTPATIENT
Start: 2023-02-27 | End: 2023-02-27

## 2023-03-02 ENCOUNTER — PATIENT MESSAGE (OUTPATIENT)
Dept: MATERNAL FETAL MEDICINE | Facility: CLINIC | Age: 28
End: 2023-03-02
Payer: COMMERCIAL

## 2023-03-03 ENCOUNTER — PROCEDURE VISIT (OUTPATIENT)
Dept: MATERNAL FETAL MEDICINE | Facility: CLINIC | Age: 28
End: 2023-03-03
Attending: STUDENT IN AN ORGANIZED HEALTH CARE EDUCATION/TRAINING PROGRAM
Payer: MEDICAID

## 2023-03-03 DIAGNOSIS — Z36.89 ENCOUNTER FOR FETAL ANATOMIC SURVEY: ICD-10-CM

## 2023-03-03 PROCEDURE — 76805 US MFM PROCEDURE (VIEWPOINT): ICD-10-PCS | Mod: 26,S$PBB,, | Performed by: OBSTETRICS & GYNECOLOGY

## 2023-03-03 PROCEDURE — 76805 OB US >/= 14 WKS SNGL FETUS: CPT | Mod: PBBFAC | Performed by: OBSTETRICS & GYNECOLOGY

## 2023-03-07 ENCOUNTER — PATIENT MESSAGE (OUTPATIENT)
Dept: OTHER | Facility: OTHER | Age: 28
End: 2023-03-07
Payer: COMMERCIAL

## 2023-03-23 ENCOUNTER — ROUTINE PRENATAL (OUTPATIENT)
Dept: OBSTETRICS AND GYNECOLOGY | Facility: CLINIC | Age: 28
End: 2023-03-23
Attending: STUDENT IN AN ORGANIZED HEALTH CARE EDUCATION/TRAINING PROGRAM
Payer: MEDICAID

## 2023-03-23 VITALS — BODY MASS INDEX: 26.4 KG/M2 | DIASTOLIC BLOOD PRESSURE: 72 MMHG | SYSTOLIC BLOOD PRESSURE: 100 MMHG | WEIGHT: 130.75 LBS

## 2023-03-23 DIAGNOSIS — Z34.90 PREGNANCY, UNSPECIFIED GESTATIONAL AGE: Primary | ICD-10-CM

## 2023-03-23 PROCEDURE — 99213 OFFICE O/P EST LOW 20 MIN: CPT | Mod: TH,S$PBB,, | Performed by: STUDENT IN AN ORGANIZED HEALTH CARE EDUCATION/TRAINING PROGRAM

## 2023-03-23 PROCEDURE — 99213 PR OFFICE/OUTPT VISIT, EST, LEVL III, 20-29 MIN: ICD-10-PCS | Mod: TH,S$PBB,, | Performed by: STUDENT IN AN ORGANIZED HEALTH CARE EDUCATION/TRAINING PROGRAM

## 2023-03-23 PROCEDURE — 99212 OFFICE O/P EST SF 10 MIN: CPT | Mod: PBBFAC,TH | Performed by: STUDENT IN AN ORGANIZED HEALTH CARE EDUCATION/TRAINING PROGRAM

## 2023-03-23 PROCEDURE — 99999 PR PBB SHADOW E&M-EST. PATIENT-LVL II: CPT | Mod: PBBFAC,,, | Performed by: STUDENT IN AN ORGANIZED HEALTH CARE EDUCATION/TRAINING PROGRAM

## 2023-03-23 PROCEDURE — 99999 PR PBB SHADOW E&M-EST. PATIENT-LVL II: ICD-10-PCS | Mod: PBBFAC,,, | Performed by: STUDENT IN AN ORGANIZED HEALTH CARE EDUCATION/TRAINING PROGRAM

## 2023-03-24 NOTE — PROGRESS NOTES
Doing well.  No complaints.  Reports fetal movement.  Denies contractions, leakage of fluid, vaginal bleeding.  Glucose nad cbc.  All questions answered.  RTC in 4 weeks or sooner if needed.

## 2023-04-04 ENCOUNTER — PATIENT MESSAGE (OUTPATIENT)
Dept: OTHER | Facility: OTHER | Age: 28
End: 2023-04-04
Payer: COMMERCIAL

## 2023-04-17 ENCOUNTER — ROUTINE PRENATAL (OUTPATIENT)
Dept: OBSTETRICS AND GYNECOLOGY | Facility: CLINIC | Age: 28
End: 2023-04-17
Attending: STUDENT IN AN ORGANIZED HEALTH CARE EDUCATION/TRAINING PROGRAM
Payer: MEDICAID

## 2023-04-17 ENCOUNTER — LAB VISIT (OUTPATIENT)
Dept: LAB | Facility: HOSPITAL | Age: 28
End: 2023-04-17
Attending: STUDENT IN AN ORGANIZED HEALTH CARE EDUCATION/TRAINING PROGRAM
Payer: MEDICAID

## 2023-04-17 VITALS — BODY MASS INDEX: 27.54 KG/M2 | SYSTOLIC BLOOD PRESSURE: 93 MMHG | WEIGHT: 136.38 LBS | DIASTOLIC BLOOD PRESSURE: 56 MMHG

## 2023-04-17 DIAGNOSIS — Z34.90 PREGNANCY, UNSPECIFIED GESTATIONAL AGE: ICD-10-CM

## 2023-04-17 DIAGNOSIS — Z3A.26 26 WEEKS GESTATION OF PREGNANCY: Primary | ICD-10-CM

## 2023-04-17 LAB
BASOPHILS # BLD AUTO: 0.03 K/UL (ref 0–0.2)
BASOPHILS NFR BLD: 0.4 % (ref 0–1.9)
DIFFERENTIAL METHOD: ABNORMAL
EOSINOPHIL # BLD AUTO: 0.1 K/UL (ref 0–0.5)
EOSINOPHIL NFR BLD: 0.9 % (ref 0–8)
ERYTHROCYTE [DISTWIDTH] IN BLOOD BY AUTOMATED COUNT: 14.7 % (ref 11.5–14.5)
GLUCOSE SERPL-MCNC: 124 MG/DL (ref 70–140)
HCT VFR BLD AUTO: 33.5 % (ref 37–48.5)
HGB BLD-MCNC: 10.6 G/DL (ref 12–16)
IMM GRANULOCYTES # BLD AUTO: 0.04 K/UL (ref 0–0.04)
IMM GRANULOCYTES NFR BLD AUTO: 0.5 % (ref 0–0.5)
LYMPHOCYTES # BLD AUTO: 1.4 K/UL (ref 1–4.8)
LYMPHOCYTES NFR BLD: 16.5 % (ref 18–48)
MCH RBC QN AUTO: 27.2 PG (ref 27–31)
MCHC RBC AUTO-ENTMCNC: 31.6 G/DL (ref 32–36)
MCV RBC AUTO: 86 FL (ref 82–98)
MONOCYTES # BLD AUTO: 0.6 K/UL (ref 0.3–1)
MONOCYTES NFR BLD: 6.5 % (ref 4–15)
NEUTROPHILS # BLD AUTO: 6.4 K/UL (ref 1.8–7.7)
NEUTROPHILS NFR BLD: 75.2 % (ref 38–73)
NRBC BLD-RTO: 0 /100 WBC
PLATELET # BLD AUTO: 353 K/UL (ref 150–450)
PMV BLD AUTO: 10.7 FL (ref 9.2–12.9)
RBC # BLD AUTO: 3.89 M/UL (ref 4–5.4)
WBC # BLD AUTO: 8.55 K/UL (ref 3.9–12.7)

## 2023-04-17 PROCEDURE — 85025 COMPLETE CBC W/AUTO DIFF WBC: CPT | Performed by: STUDENT IN AN ORGANIZED HEALTH CARE EDUCATION/TRAINING PROGRAM

## 2023-04-17 PROCEDURE — 99999 PR PBB SHADOW E&M-EST. PATIENT-LVL II: CPT | Mod: PBBFAC,,, | Performed by: STUDENT IN AN ORGANIZED HEALTH CARE EDUCATION/TRAINING PROGRAM

## 2023-04-17 PROCEDURE — 99212 OFFICE O/P EST SF 10 MIN: CPT | Mod: PBBFAC,PN | Performed by: STUDENT IN AN ORGANIZED HEALTH CARE EDUCATION/TRAINING PROGRAM

## 2023-04-17 PROCEDURE — 99213 OFFICE O/P EST LOW 20 MIN: CPT | Mod: TH,S$PBB,, | Performed by: STUDENT IN AN ORGANIZED HEALTH CARE EDUCATION/TRAINING PROGRAM

## 2023-04-17 PROCEDURE — 99999 PR PBB SHADOW E&M-EST. PATIENT-LVL II: ICD-10-PCS | Mod: PBBFAC,,, | Performed by: STUDENT IN AN ORGANIZED HEALTH CARE EDUCATION/TRAINING PROGRAM

## 2023-04-17 PROCEDURE — 99213 PR OFFICE/OUTPT VISIT, EST, LEVL III, 20-29 MIN: ICD-10-PCS | Mod: TH,S$PBB,, | Performed by: STUDENT IN AN ORGANIZED HEALTH CARE EDUCATION/TRAINING PROGRAM

## 2023-04-17 PROCEDURE — 82950 GLUCOSE TEST: CPT | Performed by: STUDENT IN AN ORGANIZED HEALTH CARE EDUCATION/TRAINING PROGRAM

## 2023-04-18 ENCOUNTER — PATIENT MESSAGE (OUTPATIENT)
Dept: OTHER | Facility: OTHER | Age: 28
End: 2023-04-18
Payer: COMMERCIAL

## 2023-04-19 NOTE — PROGRESS NOTES
Doing well.  No complaints.  Reports fetal movement.  Denies contractions, leakage of fluid, vaginal bleeding.  Glucose and cbc.  All questions answered.  RTC in 4 weeks or sooner if needed.

## 2023-05-02 ENCOUNTER — PATIENT MESSAGE (OUTPATIENT)
Dept: OTHER | Facility: OTHER | Age: 28
End: 2023-05-02
Payer: COMMERCIAL

## 2023-05-03 ENCOUNTER — PATIENT MESSAGE (OUTPATIENT)
Dept: OBSTETRICS AND GYNECOLOGY | Facility: CLINIC | Age: 28
End: 2023-05-03
Payer: COMMERCIAL

## 2023-05-15 ENCOUNTER — ROUTINE PRENATAL (OUTPATIENT)
Dept: OBSTETRICS AND GYNECOLOGY | Facility: CLINIC | Age: 28
End: 2023-05-15
Attending: STUDENT IN AN ORGANIZED HEALTH CARE EDUCATION/TRAINING PROGRAM
Payer: MEDICAID

## 2023-05-15 VITALS
WEIGHT: 141.56 LBS | BODY MASS INDEX: 28.59 KG/M2 | DIASTOLIC BLOOD PRESSURE: 68 MMHG | SYSTOLIC BLOOD PRESSURE: 104 MMHG

## 2023-05-15 DIAGNOSIS — Z3A.29 29 WEEKS GESTATION OF PREGNANCY: Primary | ICD-10-CM

## 2023-05-15 PROCEDURE — 99213 OFFICE O/P EST LOW 20 MIN: CPT | Mod: TH,S$PBB,, | Performed by: STUDENT IN AN ORGANIZED HEALTH CARE EDUCATION/TRAINING PROGRAM

## 2023-05-15 PROCEDURE — 99999 PR PBB SHADOW E&M-EST. PATIENT-LVL II: ICD-10-PCS | Mod: PBBFAC,,, | Performed by: STUDENT IN AN ORGANIZED HEALTH CARE EDUCATION/TRAINING PROGRAM

## 2023-05-15 PROCEDURE — 99213 PR OFFICE/OUTPT VISIT, EST, LEVL III, 20-29 MIN: ICD-10-PCS | Mod: TH,S$PBB,, | Performed by: STUDENT IN AN ORGANIZED HEALTH CARE EDUCATION/TRAINING PROGRAM

## 2023-05-15 PROCEDURE — 99212 OFFICE O/P EST SF 10 MIN: CPT | Mod: PBBFAC | Performed by: STUDENT IN AN ORGANIZED HEALTH CARE EDUCATION/TRAINING PROGRAM

## 2023-05-15 PROCEDURE — 99999 PR PBB SHADOW E&M-EST. PATIENT-LVL II: CPT | Mod: PBBFAC,,, | Performed by: STUDENT IN AN ORGANIZED HEALTH CARE EDUCATION/TRAINING PROGRAM

## 2023-05-15 NOTE — PROGRESS NOTES
Doing well.  No complaints.  Reports fetal movement.  Denies contractions, leakage of fluid, vaginal bleeding.  Reviewed labs.  Also weight and BP.  Discussed hemoglobin electrophoresis, hasn't had drawn yet.  Reviewed next steps and what to expect in 3rd trimester.  Is considering TDAP at her next visit.  Continue asa 81 mg for pre e prevention.  All questions answered.  RTC in 4 weeks or sooner if needed.    Face to Face time with patient: 10  20 minutes of total time spent on the encounter, which includes face to face time and non-face to face time preparing to see the patient (eg, review of tests), Obtaining and/or reviewing separately obtained history, Documenting clinical information in the electronic or other health record, Independently interpreting results (not separately reported) and communicating results to the patient/family/caregiver, or Care coordination (not separately reported).

## 2023-05-16 ENCOUNTER — PATIENT MESSAGE (OUTPATIENT)
Dept: OTHER | Facility: OTHER | Age: 28
End: 2023-05-16
Payer: COMMERCIAL

## 2023-05-30 ENCOUNTER — PATIENT MESSAGE (OUTPATIENT)
Dept: OTHER | Facility: OTHER | Age: 28
End: 2023-05-30
Payer: COMMERCIAL

## 2023-06-01 ENCOUNTER — HOSPITAL ENCOUNTER (EMERGENCY)
Facility: HOSPITAL | Age: 28
Discharge: HOME | End: 2023-06-01
Attending: EMERGENCY MEDICINE
Payer: MEDICAID

## 2023-06-01 VITALS
TEMPERATURE: 98 F | HEART RATE: 81 BPM | RESPIRATION RATE: 16 BRPM | SYSTOLIC BLOOD PRESSURE: 105 MMHG | DIASTOLIC BLOOD PRESSURE: 71 MMHG | OXYGEN SATURATION: 99 %

## 2023-06-01 DIAGNOSIS — R55 NEAR SYNCOPE: Primary | ICD-10-CM

## 2023-06-01 DIAGNOSIS — R42 DIZZINESS: ICD-10-CM

## 2023-06-01 DIAGNOSIS — E16.2 HYPOGLYCEMIA: ICD-10-CM

## 2023-06-01 LAB
ALBUMIN SERPL BCP-MCNC: 3 G/DL (ref 3.5–5.2)
ALP SERPL-CCNC: 74 U/L (ref 55–135)
ALT SERPL W/O P-5'-P-CCNC: 29 U/L (ref 10–44)
ANION GAP SERPL CALC-SCNC: 9 MMOL/L (ref 8–16)
AST SERPL-CCNC: 26 U/L (ref 10–40)
BASOPHILS # BLD AUTO: 0.04 K/UL (ref 0–0.2)
BASOPHILS NFR BLD: 0.5 % (ref 0–1.9)
BILIRUB SERPL-MCNC: 0.4 MG/DL (ref 0.1–1)
BILIRUB UR QL STRIP: NEGATIVE
BUN SERPL-MCNC: 7 MG/DL (ref 6–20)
CALCIUM SERPL-MCNC: 9.5 MG/DL (ref 8.7–10.5)
CHLORIDE SERPL-SCNC: 107 MMOL/L (ref 95–110)
CLARITY UR REFRACT.AUTO: ABNORMAL
CO2 SERPL-SCNC: 20 MMOL/L (ref 23–29)
COLOR UR AUTO: YELLOW
CREAT SERPL-MCNC: 0.6 MG/DL (ref 0.5–1.4)
DIFFERENTIAL METHOD: ABNORMAL
EOSINOPHIL # BLD AUTO: 0.1 K/UL (ref 0–0.5)
EOSINOPHIL NFR BLD: 0.7 % (ref 0–8)
ERYTHROCYTE [DISTWIDTH] IN BLOOD BY AUTOMATED COUNT: 13.6 % (ref 11.5–14.5)
EST. GFR  (NO RACE VARIABLE): >60 ML/MIN/1.73 M^2
GLUCOSE SERPL-MCNC: 54 MG/DL (ref 70–110)
GLUCOSE UR QL STRIP: NEGATIVE
HCT VFR BLD AUTO: 33.5 % (ref 37–48.5)
HGB BLD-MCNC: 10.8 G/DL (ref 12–16)
HGB UR QL STRIP: NEGATIVE
IMM GRANULOCYTES # BLD AUTO: 0.07 K/UL (ref 0–0.04)
IMM GRANULOCYTES NFR BLD AUTO: 0.9 % (ref 0–0.5)
KETONES UR QL STRIP: NEGATIVE
LEUKOCYTE ESTERASE UR QL STRIP: NEGATIVE
LYMPHOCYTES # BLD AUTO: 1.2 K/UL (ref 1–4.8)
LYMPHOCYTES NFR BLD: 14.1 % (ref 18–48)
MCH RBC QN AUTO: 26.5 PG (ref 27–31)
MCHC RBC AUTO-ENTMCNC: 32.2 G/DL (ref 32–36)
MCV RBC AUTO: 82 FL (ref 82–98)
MONOCYTES # BLD AUTO: 0.7 K/UL (ref 0.3–1)
MONOCYTES NFR BLD: 8.9 % (ref 4–15)
NEUTROPHILS # BLD AUTO: 6.1 K/UL (ref 1.8–7.7)
NEUTROPHILS NFR BLD: 74.9 % (ref 38–73)
NITRITE UR QL STRIP: NEGATIVE
NRBC BLD-RTO: 0 /100 WBC
PH UR STRIP: 6 [PH] (ref 5–8)
PLATELET # BLD AUTO: 328 K/UL (ref 150–450)
PMV BLD AUTO: 10.2 FL (ref 9.2–12.9)
POCT GLUCOSE: 79 MG/DL (ref 70–110)
POTASSIUM SERPL-SCNC: 3.8 MMOL/L (ref 3.5–5.1)
PROT SERPL-MCNC: 6.8 G/DL (ref 6–8.4)
PROT UR QL STRIP: ABNORMAL
RBC # BLD AUTO: 4.08 M/UL (ref 4–5.4)
SODIUM SERPL-SCNC: 136 MMOL/L (ref 136–145)
SP GR UR STRIP: 1.01 (ref 1–1.03)
URN SPEC COLLECT METH UR: ABNORMAL
WBC # BLD AUTO: 8.18 K/UL (ref 3.9–12.7)

## 2023-06-01 PROCEDURE — 82962 GLUCOSE BLOOD TEST: CPT

## 2023-06-01 PROCEDURE — 99284 PR EMERGENCY DEPT VISIT,LEVEL IV: ICD-10-PCS | Mod: ,,, | Performed by: PHYSICIAN ASSISTANT

## 2023-06-01 PROCEDURE — 99284 EMERGENCY DEPT VISIT MOD MDM: CPT

## 2023-06-01 PROCEDURE — 63600175 PHARM REV CODE 636 W HCPCS: Performed by: PHYSICIAN ASSISTANT

## 2023-06-01 PROCEDURE — 99284 EMERGENCY DEPT VISIT MOD MDM: CPT | Mod: ,,, | Performed by: PHYSICIAN ASSISTANT

## 2023-06-01 PROCEDURE — 93005 ELECTROCARDIOGRAM TRACING: CPT

## 2023-06-01 PROCEDURE — 81003 URINALYSIS AUTO W/O SCOPE: CPT | Performed by: PHYSICIAN ASSISTANT

## 2023-06-01 PROCEDURE — 96360 HYDRATION IV INFUSION INIT: CPT

## 2023-06-01 PROCEDURE — 96361 HYDRATE IV INFUSION ADD-ON: CPT

## 2023-06-01 PROCEDURE — 80053 COMPREHEN METABOLIC PANEL: CPT | Performed by: PHYSICIAN ASSISTANT

## 2023-06-01 PROCEDURE — 85025 COMPLETE CBC W/AUTO DIFF WBC: CPT | Performed by: PHYSICIAN ASSISTANT

## 2023-06-01 PROCEDURE — 93010 EKG 12-LEAD: ICD-10-PCS | Mod: ,,, | Performed by: INTERNAL MEDICINE

## 2023-06-01 PROCEDURE — 93010 ELECTROCARDIOGRAM REPORT: CPT | Mod: ,,, | Performed by: INTERNAL MEDICINE

## 2023-06-01 RX ADMIN — SODIUM CHLORIDE, POTASSIUM CHLORIDE, SODIUM LACTATE AND CALCIUM CHLORIDE 1000 ML: 600; 310; 30; 20 INJECTION, SOLUTION INTRAVENOUS at 11:06

## 2023-06-01 NOTE — ED TRIAGE NOTES
Pt complains of dizziness times on episode around  10:30 am today  Pt states she is 32 weeks pregnant  Pt states she is not dizzy at present

## 2023-06-01 NOTE — ED PROVIDER NOTES
"Encounter Date: 6/1/2023       History     Chief Complaint   Patient presents with    Dizziness     32 weeks pregnant, states felt dizzy about 30 minutes and "almost passed out" denies pain anywhere, felling baby move     28-year-old female currently 32 weeks pregnant presents to the ER due to near syncopal episode that occurred today prior to arrival.  Patient says that she was at work and was standing and sitting often.  She felt dehydrated, thin began to feel weak and her vision became blurry and she felt that she might pass out.  She reports lightheadedness.  She did not pass out or fall to the ground.  Patient reports that a co-worker gave her water and a peppermint and she began to feel improved.  Patient had had a meal including burrito and donut 30 minutes prior to this episode.  She denies chest pain, shortness of breath, abdominal pain, pelvic pain, dysuria, or recent vomiting or diarrhea.  She has intermittent nausea.  Patient is feeling improved now.  She is feeling normal fetal movements.  She is accompanied by her parents.  She has no other complaints at this time.    The history is provided by the patient.   Review of patient's allergies indicates:  No Known Allergies  History reviewed. No pertinent past medical history.  History reviewed. No pertinent surgical history.  Family History   Problem Relation Age of Onset    Breast cancer Maternal Grandmother     Colon cancer Neg Hx     Ovarian cancer Neg Hx      Social History     Tobacco Use    Smoking status: Never    Smokeless tobacco: Never   Substance Use Topics    Alcohol use: Not Currently    Drug use: No     Review of Systems   Constitutional:  Negative for chills and fever.   HENT:  Negative for sore throat.    Respiratory:  Negative for cough and shortness of breath.    Cardiovascular:  Negative for chest pain.   Gastrointestinal:  Positive for nausea. Negative for abdominal pain and vomiting.   Genitourinary:  Negative for difficulty urinating, " dysuria, vaginal bleeding and vaginal discharge.   Musculoskeletal:  Negative for back pain.   Skin:  Negative for rash.   Neurological:  Positive for light-headedness. Negative for weakness.   Hematological:  Does not bruise/bleed easily.     Physical Exam     Initial Vitals [06/01/23 1101]   BP Pulse Resp Temp SpO2   104/67 80 18 98.1 °F (36.7 °C) 100 %      MAP       --         Physical Exam    Nursing note and vitals reviewed.  Constitutional: She appears well-developed and well-nourished.   HENT:   Head: Atraumatic.   Eyes: Conjunctivae and EOM are normal. Pupils are equal, round, and reactive to light.   Neck: Neck supple.   Normal range of motion.  Cardiovascular:  Normal rate, regular rhythm and intact distal pulses.           Pulmonary/Chest: Breath sounds normal. No respiratory distress. She has no wheezes. She has no rhonchi. She has no rales.   Abdominal: Abdomen is soft. Bowel sounds are normal. There is no abdominal tenderness.   Fetal heart tones 150s and fetal movements observed on bedside ultrasound.  Gravid abdomen. There is no rebound and no guarding.   Musculoskeletal:      Cervical back: Normal range of motion and neck supple.     Neurological: She is alert and oriented to person, place, and time. She has normal strength. GCS score is 15. GCS eye subscore is 4. GCS verbal subscore is 5. GCS motor subscore is 6.   Skin: Capillary refill takes less than 2 seconds. No rash noted.   Psychiatric: She has a normal mood and affect.       ED Course   Procedures  Labs Reviewed   URINALYSIS, REFLEX TO URINE CULTURE - Abnormal; Notable for the following components:       Result Value    Appearance, UA Hazy (*)     Protein, UA Trace (*)     All other components within normal limits    Narrative:     Specimen Source->Urine   CBC W/ AUTO DIFFERENTIAL - Abnormal; Notable for the following components:    Hemoglobin 10.8 (*)     Hematocrit 33.5 (*)     MCH 26.5 (*)     Immature Granulocytes 0.9 (*)     Immature  Grans (Abs) 0.07 (*)     Gran % 74.9 (*)     Lymph % 14.1 (*)     All other components within normal limits   COMPREHENSIVE METABOLIC PANEL - Abnormal; Notable for the following components:    CO2 20 (*)     Glucose 54 (*)     Albumin 3.0 (*)     All other components within normal limits   POCT GLUCOSE     EKG Readings: (Independently Interpreted)   Initial Reading: No STEMI. Rhythm: Normal Sinus Rhythm. Heart Rate: 78.     Imaging Results    None          Medications   lactated ringers bolus 1,000 mL (0 mLs Intravenous Stopped 6/1/23 1333)           APC / Resident Notes:   Patient presenting due to near syncopal episode that happened prior to arrival.  Patient says she is feeling improved now since drinking water and eating a peppermint.  Vital signs are stable.  She does have mild decrease of blood pressure from 110 systolic lying to 102 systolic standing and pulse of 93.  Will obtain blood work, give IV hydration and reassess.  We have confirmed fetal heart tones and fetal movements observed on bedside ultrasound.  Patient does not have vaginal bleeding, abdominal or pelvic pain or other pregnancy related complaints.  UA without evidence of infection.  Patient's labs are largely within normal limits.  Mild anemia consistent with recent labs.  No leukocytosis, fever or other symptoms or signs of infection.  Glucose is 54.  She is given juice and repeat is 79.  Patient says she is feeling well and back to normal.    Patient advised on increase fluids, frequent meals and snacks throughout the day.  EKG shows normal sinus rhythm, no evidence of acute ischemia or arrhythmia.  She has no chest pain or shortness of breath.  Patient is stable for discharge and is given ER return precautions.  I have advised that she follow-up with her OB in 1-2 days.  She is comfortable with discharge plan.  I discussed the care this patient my supervising physician.                   Clinical Impression:   Final diagnoses:  [R42]  Dizziness  [R55] Near syncope (Primary)  [E16.2] Hypoglycemia        ED Disposition Condition    Discharge Stable          ED Prescriptions    None       Follow-up Information       Follow up With Specialties Details Why Contact Info    Maia Tello MD Obstetrics and Gynecology Call in 1 day To discuss ER visit and schedule follow up appointment within 1 week [address] [address]  [phone]               COMFORT Shaw  06/01/23 1548

## 2023-06-01 NOTE — ED NOTES
Pt identifiers Paz Bentley were checked and are correct  LOC: The patient is awake, alert, aware of environment with an appropriate affect. Oriented x4 , speaking appropriately  APPEARANCE: Pt resting comfortably, in no acute distress, pt is clean and well groomed, clothing properly fastened  SKIN: Skin warm, dry and intact, normal skin turgor, moist mucus membranes  RESPIRATORY: Airway is open and patent, respirations are spontaneous, even and unlabored, normal effort and rate  CARDIAC: Normal rate and rhythm, no peripheral edema noted, capillary refill < 3 seconds, bilateral radial pulses 2+  ABDOMEN: large  pt is 32 weeks pregnant   NEUROLOGIC: PERRL, facial expression is symmetrical, patient moving all extremities spontaneously, normal sensation in all extremities when touched with a finger.  Follows all commands appropriately  MUSCULOSKELETAL: No obvious deformities.

## 2023-06-01 NOTE — PROVIDER PROGRESS NOTES - EMERGENCY DEPT.
Encounter Date: 6/1/2023    ED Physician Progress Notes            EKG interpretation by ED attending physician:  NSR, rate 78, no ST changes, no ischemia, normal intervals.  Compared with prior EKG dated 06/2016, grossly stable without significant change.

## 2023-06-05 ENCOUNTER — ROUTINE PRENATAL (OUTPATIENT)
Dept: OBSTETRICS AND GYNECOLOGY | Facility: CLINIC | Age: 28
End: 2023-06-05
Attending: STUDENT IN AN ORGANIZED HEALTH CARE EDUCATION/TRAINING PROGRAM
Payer: MEDICAID

## 2023-06-05 ENCOUNTER — PATIENT MESSAGE (OUTPATIENT)
Dept: OBSTETRICS AND GYNECOLOGY | Facility: CLINIC | Age: 28
End: 2023-06-05

## 2023-06-05 VITALS
WEIGHT: 143.31 LBS | SYSTOLIC BLOOD PRESSURE: 100 MMHG | BODY MASS INDEX: 28.94 KG/M2 | DIASTOLIC BLOOD PRESSURE: 62 MMHG

## 2023-06-05 DIAGNOSIS — Z3A.32 32 WEEKS GESTATION OF PREGNANCY: Primary | ICD-10-CM

## 2023-06-05 PROCEDURE — 99212 OFFICE O/P EST SF 10 MIN: CPT | Mod: PBBFAC | Performed by: STUDENT IN AN ORGANIZED HEALTH CARE EDUCATION/TRAINING PROGRAM

## 2023-06-05 PROCEDURE — 99213 PR OFFICE/OUTPT VISIT, EST, LEVL III, 20-29 MIN: ICD-10-PCS | Mod: TH,S$PBB,, | Performed by: STUDENT IN AN ORGANIZED HEALTH CARE EDUCATION/TRAINING PROGRAM

## 2023-06-05 PROCEDURE — 99999 PR PBB SHADOW E&M-EST. PATIENT-LVL II: CPT | Mod: PBBFAC,,, | Performed by: STUDENT IN AN ORGANIZED HEALTH CARE EDUCATION/TRAINING PROGRAM

## 2023-06-05 PROCEDURE — 99999 PR PBB SHADOW E&M-EST. PATIENT-LVL II: ICD-10-PCS | Mod: PBBFAC,,, | Performed by: STUDENT IN AN ORGANIZED HEALTH CARE EDUCATION/TRAINING PROGRAM

## 2023-06-05 PROCEDURE — 99213 OFFICE O/P EST LOW 20 MIN: CPT | Mod: TH,S$PBB,, | Performed by: STUDENT IN AN ORGANIZED HEALTH CARE EDUCATION/TRAINING PROGRAM

## 2023-06-05 NOTE — PROGRESS NOTES
Doing well.  No complaints.  Reports fetal movement.  Denies contractions, leakage of fluid, vaginal bleeding. Declines tdap -r/b/a reviewed.   Is interested in prenatal classes - handout given.  Reviewed pediatrician options.  Declines cord blood banking.  Reviewed L&D and ED precautions reviewed.  All questions answered.  RTC in 2 weeks or sooner if needed.    Face to Face time with patient: 15  20 minutes of total time spent on the encounter, which includes face to face time and non-face to face time preparing to see the patient (eg, review of tests), Obtaining and/or reviewing separately obtained history, Documenting clinical information in the electronic or other health record, Independently interpreting results (not separately reported) and communicating results to the patient/family/caregiver, or Care coordination (not separately reported).

## 2023-06-18 ENCOUNTER — PATIENT MESSAGE (OUTPATIENT)
Dept: OBSTETRICS AND GYNECOLOGY | Facility: CLINIC | Age: 28
End: 2023-06-18
Payer: COMMERCIAL

## 2023-06-19 ENCOUNTER — ROUTINE PRENATAL (OUTPATIENT)
Dept: OBSTETRICS AND GYNECOLOGY | Facility: CLINIC | Age: 28
End: 2023-06-19
Attending: STUDENT IN AN ORGANIZED HEALTH CARE EDUCATION/TRAINING PROGRAM
Payer: MEDICAID

## 2023-06-19 VITALS — DIASTOLIC BLOOD PRESSURE: 70 MMHG | WEIGHT: 145.5 LBS | SYSTOLIC BLOOD PRESSURE: 102 MMHG | BODY MASS INDEX: 29.39 KG/M2

## 2023-06-19 DIAGNOSIS — Z3A.34 34 WEEKS GESTATION OF PREGNANCY: Primary | ICD-10-CM

## 2023-06-19 PROCEDURE — 99213 PR OFFICE/OUTPT VISIT, EST, LEVL III, 20-29 MIN: ICD-10-PCS | Mod: TH,S$PBB,, | Performed by: STUDENT IN AN ORGANIZED HEALTH CARE EDUCATION/TRAINING PROGRAM

## 2023-06-19 PROCEDURE — 99999 PR PBB SHADOW E&M-EST. PATIENT-LVL II: ICD-10-PCS | Mod: PBBFAC,,, | Performed by: STUDENT IN AN ORGANIZED HEALTH CARE EDUCATION/TRAINING PROGRAM

## 2023-06-19 PROCEDURE — 99213 OFFICE O/P EST LOW 20 MIN: CPT | Mod: TH,S$PBB,, | Performed by: STUDENT IN AN ORGANIZED HEALTH CARE EDUCATION/TRAINING PROGRAM

## 2023-06-19 PROCEDURE — 99212 OFFICE O/P EST SF 10 MIN: CPT | Mod: PBBFAC | Performed by: STUDENT IN AN ORGANIZED HEALTH CARE EDUCATION/TRAINING PROGRAM

## 2023-06-19 PROCEDURE — 99999 PR PBB SHADOW E&M-EST. PATIENT-LVL II: CPT | Mod: PBBFAC,,, | Performed by: STUDENT IN AN ORGANIZED HEALTH CARE EDUCATION/TRAINING PROGRAM

## 2023-06-19 NOTE — PROGRESS NOTES
Doing well.  No complaints.  Reports fetal movement.  Denies contractions, leakage of fluid, vaginal bleeding.  Discussed next steps and what to expect.  GBS and consents at next visit.  Is looking into classes.  Declines tdap.  Is feeling a little congested - will try OTC meds, reviewed safe meds inpregnancy.  And will let us know if no improvement.  Has pediatrician set up.  All questions answered.  RTC in 2 weeks or sooner if needed.    Face to Face time with patient: 10 mins  20 minutes of total time spent on the encounter, which includes face to face time and non-face to face time preparing to see the patient (eg, review of tests), Obtaining and/or reviewing separately obtained history, Documenting clinical information in the electronic or other health record, Independently interpreting results (not separately reported) and communicating results to the patient/family/caregiver, or Care coordination (not separately reported).

## 2023-06-20 ENCOUNTER — PATIENT MESSAGE (OUTPATIENT)
Dept: OTHER | Facility: OTHER | Age: 28
End: 2023-06-20
Payer: COMMERCIAL

## 2023-06-26 ENCOUNTER — ROUTINE PRENATAL (OUTPATIENT)
Dept: OBSTETRICS AND GYNECOLOGY | Facility: CLINIC | Age: 28
End: 2023-06-26
Attending: STUDENT IN AN ORGANIZED HEALTH CARE EDUCATION/TRAINING PROGRAM
Payer: MEDICAID

## 2023-06-26 VITALS
SYSTOLIC BLOOD PRESSURE: 102 MMHG | DIASTOLIC BLOOD PRESSURE: 60 MMHG | BODY MASS INDEX: 29.83 KG/M2 | WEIGHT: 147.69 LBS

## 2023-06-26 DIAGNOSIS — Z3A.35 35 WEEKS GESTATION OF PREGNANCY: Primary | ICD-10-CM

## 2023-06-26 PROCEDURE — 99212 PR OFFICE/OUTPT VISIT, EST, LEVL II, 10-19 MIN: ICD-10-PCS | Mod: TH,S$PBB,, | Performed by: STUDENT IN AN ORGANIZED HEALTH CARE EDUCATION/TRAINING PROGRAM

## 2023-06-26 PROCEDURE — 99999 PR PBB SHADOW E&M-EST. PATIENT-LVL II: ICD-10-PCS | Mod: PBBFAC,,, | Performed by: STUDENT IN AN ORGANIZED HEALTH CARE EDUCATION/TRAINING PROGRAM

## 2023-06-26 PROCEDURE — 99212 OFFICE O/P EST SF 10 MIN: CPT | Mod: PBBFAC | Performed by: STUDENT IN AN ORGANIZED HEALTH CARE EDUCATION/TRAINING PROGRAM

## 2023-06-26 PROCEDURE — 99212 OFFICE O/P EST SF 10 MIN: CPT | Mod: TH,S$PBB,, | Performed by: STUDENT IN AN ORGANIZED HEALTH CARE EDUCATION/TRAINING PROGRAM

## 2023-06-26 PROCEDURE — 99999 PR PBB SHADOW E&M-EST. PATIENT-LVL II: CPT | Mod: PBBFAC,,, | Performed by: STUDENT IN AN ORGANIZED HEALTH CARE EDUCATION/TRAINING PROGRAM

## 2023-06-26 NOTE — PROGRESS NOTES
Doing well.  No complaints.  Reports fetal movement.  Denies contractions, leakage of fluid, vaginal bleeding.  GBS and consents at 36 weeks.  Reviewed with patient.  She is in agreement with plan. All questions answered.  RTC in 1 week or sooner if needed.

## 2023-06-27 ENCOUNTER — PATIENT MESSAGE (OUTPATIENT)
Dept: RESEARCH | Facility: HOSPITAL | Age: 28
End: 2023-06-27
Payer: COMMERCIAL

## 2023-07-03 ENCOUNTER — ROUTINE PRENATAL (OUTPATIENT)
Dept: OBSTETRICS AND GYNECOLOGY | Facility: CLINIC | Age: 28
End: 2023-07-03
Attending: STUDENT IN AN ORGANIZED HEALTH CARE EDUCATION/TRAINING PROGRAM
Payer: MEDICAID

## 2023-07-03 VITALS — DIASTOLIC BLOOD PRESSURE: 68 MMHG | SYSTOLIC BLOOD PRESSURE: 114 MMHG | BODY MASS INDEX: 29.99 KG/M2 | WEIGHT: 148.5 LBS

## 2023-07-03 DIAGNOSIS — Z3A.36 36 WEEKS GESTATION OF PREGNANCY: Primary | ICD-10-CM

## 2023-07-03 PROCEDURE — 99212 OFFICE O/P EST SF 10 MIN: CPT | Mod: PBBFAC | Performed by: STUDENT IN AN ORGANIZED HEALTH CARE EDUCATION/TRAINING PROGRAM

## 2023-07-03 PROCEDURE — 99999 PR PBB SHADOW E&M-EST. PATIENT-LVL II: CPT | Mod: PBBFAC,,, | Performed by: STUDENT IN AN ORGANIZED HEALTH CARE EDUCATION/TRAINING PROGRAM

## 2023-07-03 PROCEDURE — 99213 PR OFFICE/OUTPT VISIT, EST, LEVL III, 20-29 MIN: ICD-10-PCS | Mod: TH,S$PBB,, | Performed by: STUDENT IN AN ORGANIZED HEALTH CARE EDUCATION/TRAINING PROGRAM

## 2023-07-03 PROCEDURE — 99999 PR PBB SHADOW E&M-EST. PATIENT-LVL II: ICD-10-PCS | Mod: PBBFAC,,, | Performed by: STUDENT IN AN ORGANIZED HEALTH CARE EDUCATION/TRAINING PROGRAM

## 2023-07-03 PROCEDURE — 87081 CULTURE SCREEN ONLY: CPT | Performed by: STUDENT IN AN ORGANIZED HEALTH CARE EDUCATION/TRAINING PROGRAM

## 2023-07-03 PROCEDURE — 99213 OFFICE O/P EST LOW 20 MIN: CPT | Mod: TH,S$PBB,, | Performed by: STUDENT IN AN ORGANIZED HEALTH CARE EDUCATION/TRAINING PROGRAM

## 2023-07-03 NOTE — PROGRESS NOTES
Doing well.  No complaints.  Reports fetal movement.  Denies contractions, leakage of fluid, vaginal bleeding.  GBS and consents.  SVE c/t/h.  All questions answered.  Reviewed plan of care and L&D precautions.  Has car seat.  Working on hospital bag.  Copy of consents given to bring in.  All questions answered.  RTC in 1 week or sooner if needed.    Face to Face time with patient: 15  20 minutes of total time spent on the encounter, which includes face to face time and non-face to face time preparing to see the patient (eg, review of tests), Obtaining and/or reviewing separately obtained history, Documenting clinical information in the electronic or other health record, Independently interpreting results (not separately reported) and communicating results to the patient/family/caregiver, or Care coordination (not separately reported).

## 2023-07-05 ENCOUNTER — PATIENT MESSAGE (OUTPATIENT)
Dept: RESEARCH | Facility: HOSPITAL | Age: 28
End: 2023-07-05
Payer: COMMERCIAL

## 2023-07-06 LAB — BACTERIA SPEC AEROBE CULT: NORMAL

## 2023-07-10 ENCOUNTER — ROUTINE PRENATAL (OUTPATIENT)
Dept: OBSTETRICS AND GYNECOLOGY | Facility: CLINIC | Age: 28
End: 2023-07-10
Attending: STUDENT IN AN ORGANIZED HEALTH CARE EDUCATION/TRAINING PROGRAM
Payer: MEDICAID

## 2023-07-10 ENCOUNTER — LAB VISIT (OUTPATIENT)
Dept: LAB | Facility: OTHER | Age: 28
End: 2023-07-10
Attending: STUDENT IN AN ORGANIZED HEALTH CARE EDUCATION/TRAINING PROGRAM
Payer: COMMERCIAL

## 2023-07-10 VITALS
DIASTOLIC BLOOD PRESSURE: 64 MMHG | WEIGHT: 149.25 LBS | BODY MASS INDEX: 30.15 KG/M2 | SYSTOLIC BLOOD PRESSURE: 104 MMHG

## 2023-07-10 DIAGNOSIS — Z3A.36 36 WEEKS GESTATION OF PREGNANCY: ICD-10-CM

## 2023-07-10 DIAGNOSIS — Z3A.37 37 WEEKS GESTATION OF PREGNANCY: Primary | ICD-10-CM

## 2023-07-10 LAB
BASOPHILS # BLD AUTO: 0.04 K/UL (ref 0–0.2)
BASOPHILS NFR BLD: 0.5 % (ref 0–1.9)
DIFFERENTIAL METHOD: ABNORMAL
EOSINOPHIL # BLD AUTO: 0.1 K/UL (ref 0–0.5)
EOSINOPHIL NFR BLD: 0.7 % (ref 0–8)
ERYTHROCYTE [DISTWIDTH] IN BLOOD BY AUTOMATED COUNT: 14.3 % (ref 11.5–14.5)
HCT VFR BLD AUTO: 32.2 % (ref 37–48.5)
HGB BLD-MCNC: 10.2 G/DL (ref 12–16)
HIV 1+2 AB+HIV1 P24 AG SERPL QL IA: NORMAL
IMM GRANULOCYTES # BLD AUTO: 0.05 K/UL (ref 0–0.04)
IMM GRANULOCYTES NFR BLD AUTO: 0.6 % (ref 0–0.5)
LYMPHOCYTES # BLD AUTO: 1.3 K/UL (ref 1–4.8)
LYMPHOCYTES NFR BLD: 15.6 % (ref 18–48)
MCH RBC QN AUTO: 25.2 PG (ref 27–31)
MCHC RBC AUTO-ENTMCNC: 31.7 G/DL (ref 32–36)
MCV RBC AUTO: 80 FL (ref 82–98)
MONOCYTES # BLD AUTO: 0.8 K/UL (ref 0.3–1)
MONOCYTES NFR BLD: 9.4 % (ref 4–15)
NEUTROPHILS # BLD AUTO: 6.2 K/UL (ref 1.8–7.7)
NEUTROPHILS NFR BLD: 73.2 % (ref 38–73)
NRBC BLD-RTO: 0 /100 WBC
PLATELET # BLD AUTO: 310 K/UL (ref 150–450)
PMV BLD AUTO: 10.5 FL (ref 9.2–12.9)
RBC # BLD AUTO: 4.05 M/UL (ref 4–5.4)
RPR SER QL: NORMAL
WBC # BLD AUTO: 8.48 K/UL (ref 3.9–12.7)

## 2023-07-10 PROCEDURE — 87389 HIV-1 AG W/HIV-1&-2 AB AG IA: CPT | Performed by: STUDENT IN AN ORGANIZED HEALTH CARE EDUCATION/TRAINING PROGRAM

## 2023-07-10 PROCEDURE — 86592 SYPHILIS TEST NON-TREP QUAL: CPT | Performed by: STUDENT IN AN ORGANIZED HEALTH CARE EDUCATION/TRAINING PROGRAM

## 2023-07-10 PROCEDURE — 36415 COLL VENOUS BLD VENIPUNCTURE: CPT | Performed by: STUDENT IN AN ORGANIZED HEALTH CARE EDUCATION/TRAINING PROGRAM

## 2023-07-10 PROCEDURE — 99212 OFFICE O/P EST SF 10 MIN: CPT | Mod: PBBFAC | Performed by: STUDENT IN AN ORGANIZED HEALTH CARE EDUCATION/TRAINING PROGRAM

## 2023-07-10 PROCEDURE — 87086 URINE CULTURE/COLONY COUNT: CPT | Performed by: STUDENT IN AN ORGANIZED HEALTH CARE EDUCATION/TRAINING PROGRAM

## 2023-07-10 PROCEDURE — 99213 PR OFFICE/OUTPT VISIT, EST, LEVL III, 20-29 MIN: ICD-10-PCS | Mod: TH,S$PBB,, | Performed by: STUDENT IN AN ORGANIZED HEALTH CARE EDUCATION/TRAINING PROGRAM

## 2023-07-10 PROCEDURE — 99999 PR PBB SHADOW E&M-EST. PATIENT-LVL II: ICD-10-PCS | Mod: PBBFAC,,, | Performed by: STUDENT IN AN ORGANIZED HEALTH CARE EDUCATION/TRAINING PROGRAM

## 2023-07-10 PROCEDURE — 99213 OFFICE O/P EST LOW 20 MIN: CPT | Mod: TH,S$PBB,, | Performed by: STUDENT IN AN ORGANIZED HEALTH CARE EDUCATION/TRAINING PROGRAM

## 2023-07-10 PROCEDURE — 99999 PR PBB SHADOW E&M-EST. PATIENT-LVL II: CPT | Mod: PBBFAC,,, | Performed by: STUDENT IN AN ORGANIZED HEALTH CARE EDUCATION/TRAINING PROGRAM

## 2023-07-10 PROCEDURE — 85025 COMPLETE CBC W/AUTO DIFF WBC: CPT | Performed by: STUDENT IN AN ORGANIZED HEALTH CARE EDUCATION/TRAINING PROGRAM

## 2023-07-10 NOTE — PROGRESS NOTES
Doing well.  No complaints.  Reports fetal movement.  Denies contractions, leakage of fluid, vaginal bleeding.  sve c/t/h.  Labs today.  Urine culture sent due to cramping.  Reviewed where to go and ED precautions.  Voiced understanding.  All questions answered.  RTC in 1 week or sooner if needed.    Face to Face time with patient: 10  20 minutes of total time spent on the encounter, which includes face to face time and non-face to face time preparing to see the patient (eg, review of tests), Obtaining and/or reviewing separately obtained history, Documenting clinical information in the electronic or other health record, Independently interpreting results (not separately reported) and communicating results to the patient/family/caregiver, or Care coordination (not separately reported).

## 2023-07-11 ENCOUNTER — PATIENT MESSAGE (OUTPATIENT)
Dept: RESEARCH | Facility: HOSPITAL | Age: 28
End: 2023-07-11
Payer: COMMERCIAL

## 2023-07-12 LAB — BACTERIA UR CULT: NO GROWTH

## 2023-07-17 ENCOUNTER — TELEPHONE (OUTPATIENT)
Dept: OBSTETRICS AND GYNECOLOGY | Facility: CLINIC | Age: 28
End: 2023-07-17
Payer: COMMERCIAL

## 2023-07-17 ENCOUNTER — ROUTINE PRENATAL (OUTPATIENT)
Dept: OBSTETRICS AND GYNECOLOGY | Facility: CLINIC | Age: 28
End: 2023-07-17
Attending: STUDENT IN AN ORGANIZED HEALTH CARE EDUCATION/TRAINING PROGRAM
Payer: MEDICAID

## 2023-07-17 VITALS
SYSTOLIC BLOOD PRESSURE: 102 MMHG | DIASTOLIC BLOOD PRESSURE: 64 MMHG | WEIGHT: 154.13 LBS | BODY MASS INDEX: 31.12 KG/M2

## 2023-07-17 DIAGNOSIS — Z3A.38 38 WEEKS GESTATION OF PREGNANCY: Primary | ICD-10-CM

## 2023-07-17 PROCEDURE — 99999 PR PBB SHADOW E&M-EST. PATIENT-LVL II: CPT | Mod: PBBFAC,,, | Performed by: STUDENT IN AN ORGANIZED HEALTH CARE EDUCATION/TRAINING PROGRAM

## 2023-07-17 PROCEDURE — 99212 OFFICE O/P EST SF 10 MIN: CPT | Mod: PBBFAC | Performed by: STUDENT IN AN ORGANIZED HEALTH CARE EDUCATION/TRAINING PROGRAM

## 2023-07-17 PROCEDURE — 99213 OFFICE O/P EST LOW 20 MIN: CPT | Mod: TH,S$PBB,, | Performed by: STUDENT IN AN ORGANIZED HEALTH CARE EDUCATION/TRAINING PROGRAM

## 2023-07-17 PROCEDURE — 99213 PR OFFICE/OUTPT VISIT, EST, LEVL III, 20-29 MIN: ICD-10-PCS | Mod: TH,S$PBB,, | Performed by: STUDENT IN AN ORGANIZED HEALTH CARE EDUCATION/TRAINING PROGRAM

## 2023-07-17 PROCEDURE — 99999 PR PBB SHADOW E&M-EST. PATIENT-LVL II: ICD-10-PCS | Mod: PBBFAC,,, | Performed by: STUDENT IN AN ORGANIZED HEALTH CARE EDUCATION/TRAINING PROGRAM

## 2023-07-17 NOTE — PROGRESS NOTES
Doing well.  No complaints.  Reports fetal movement.  Denies contractions, leakage of fluid, vaginal bleeding.  VTX by U/S.  Discussed IOL if no spontaneous labor. sve c/t/h.  All questions answered.  RTC in 1 week or sooner if needed.    Face to Face time with patient: 8  20 minutes of total time spent on the encounter, which includes face to face time and non-face to face time preparing to see the patient (eg, review of tests), Obtaining and/or reviewing separately obtained history, Documenting clinical information in the electronic or other health record, Independently interpreting results (not separately reported) and communicating results to the patient/family/caregiver, or Care coordination (not separately reported).

## 2023-07-18 ENCOUNTER — TELEPHONE (OUTPATIENT)
Dept: OBSTETRICS AND GYNECOLOGY | Facility: CLINIC | Age: 28
End: 2023-07-18
Payer: COMMERCIAL

## 2023-07-18 DIAGNOSIS — Z34.90 ENCOUNTER FOR ELECTIVE INDUCTION OF LABOR: Primary | ICD-10-CM

## 2023-07-18 NOTE — TELEPHONE ENCOUNTER
----- Message from Genesis Martines RN sent at 7/18/2023 11:18 AM CDT -----  Regarding: FW: IOL    ----- Message -----  From: Maia Tello MD  Sent: 7/17/2023   4:56 PM CDT  To: Genesis Martines RN  Subject: IOL                                              I'm not quite sure where this goes today.    Can we please schedule IOL for 7/26 at 5 P?  Thank you so much!

## 2023-07-19 ENCOUNTER — PATIENT MESSAGE (OUTPATIENT)
Dept: OBSTETRICS AND GYNECOLOGY | Facility: CLINIC | Age: 28
End: 2023-07-19
Payer: COMMERCIAL

## 2023-07-19 ENCOUNTER — PATIENT MESSAGE (OUTPATIENT)
Dept: RESEARCH | Facility: HOSPITAL | Age: 28
End: 2023-07-19
Payer: COMMERCIAL

## 2023-07-20 ENCOUNTER — ANESTHESIA EVENT (OUTPATIENT)
Dept: OBSTETRICS AND GYNECOLOGY | Facility: OTHER | Age: 28
End: 2023-07-20
Payer: COMMERCIAL

## 2023-07-20 ENCOUNTER — HOSPITAL ENCOUNTER (INPATIENT)
Facility: OTHER | Age: 28
LOS: 2 days | Discharge: HOME | End: 2023-07-22
Attending: OBSTETRICS & GYNECOLOGY | Admitting: OBSTETRICS & GYNECOLOGY
Payer: COMMERCIAL

## 2023-07-20 ENCOUNTER — ANESTHESIA (OUTPATIENT)
Dept: OBSTETRICS AND GYNECOLOGY | Facility: OTHER | Age: 28
End: 2023-07-20
Payer: COMMERCIAL

## 2023-07-20 DIAGNOSIS — O47.9 UTERINE CONTRACTIONS DURING PREGNANCY: ICD-10-CM

## 2023-07-20 DIAGNOSIS — Z3A.39 39 WEEKS GESTATION OF PREGNANCY: ICD-10-CM

## 2023-07-20 DIAGNOSIS — Z34.90 ENCOUNTER FOR INDUCTION OF LABOR: ICD-10-CM

## 2023-07-20 LAB
ABO + RH BLD: NORMAL
BASOPHILS # BLD AUTO: 0.04 K/UL (ref 0–0.2)
BASOPHILS NFR BLD: 0.3 % (ref 0–1.9)
BLD GP AB SCN CELLS X3 SERPL QL: NORMAL
DIFFERENTIAL METHOD: ABNORMAL
EOSINOPHIL # BLD AUTO: 0 K/UL (ref 0–0.5)
EOSINOPHIL NFR BLD: 0.2 % (ref 0–8)
ERYTHROCYTE [DISTWIDTH] IN BLOOD BY AUTOMATED COUNT: 14.8 % (ref 11.5–14.5)
HCT VFR BLD AUTO: 33.6 % (ref 37–48.5)
HGB BLD-MCNC: 10.4 G/DL (ref 12–16)
IMM GRANULOCYTES # BLD AUTO: 0.08 K/UL (ref 0–0.04)
IMM GRANULOCYTES NFR BLD AUTO: 0.7 % (ref 0–0.5)
LYMPHOCYTES # BLD AUTO: 1.4 K/UL (ref 1–4.8)
LYMPHOCYTES NFR BLD: 11.9 % (ref 18–48)
MCH RBC QN AUTO: 24.8 PG (ref 27–31)
MCHC RBC AUTO-ENTMCNC: 31 G/DL (ref 32–36)
MCV RBC AUTO: 80 FL (ref 82–98)
MONOCYTES # BLD AUTO: 1.1 K/UL (ref 0.3–1)
MONOCYTES NFR BLD: 8.8 % (ref 4–15)
NEUTROPHILS # BLD AUTO: 9.3 K/UL (ref 1.8–7.7)
NEUTROPHILS NFR BLD: 78.1 % (ref 38–73)
NRBC BLD-RTO: 0 /100 WBC
PLATELET # BLD AUTO: 317 K/UL (ref 150–450)
PMV BLD AUTO: 11.2 FL (ref 9.2–12.9)
RBC # BLD AUTO: 4.2 M/UL (ref 4–5.4)
SPECIMEN OUTDATE: NORMAL
WBC # BLD AUTO: 11.94 K/UL (ref 3.9–12.7)

## 2023-07-20 PROCEDURE — 25000003 PHARM REV CODE 250: Performed by: ANESTHESIOLOGY

## 2023-07-20 PROCEDURE — 59025 FETAL NON-STRESS TEST: CPT | Mod: 26,,, | Performed by: OBSTETRICS & GYNECOLOGY

## 2023-07-20 PROCEDURE — C1751 CATH, INF, PER/CENT/MIDLINE: HCPCS | Performed by: ANESTHESIOLOGY

## 2023-07-20 PROCEDURE — 59400 OBSTETRICAL CARE: CPT | Mod: GB,,, | Performed by: OBSTETRICS & GYNECOLOGY

## 2023-07-20 PROCEDURE — 99284 EMERGENCY DEPT VISIT MOD MDM: CPT | Mod: 25,,, | Performed by: OBSTETRICS & GYNECOLOGY

## 2023-07-20 PROCEDURE — 25000003 PHARM REV CODE 250: Performed by: STUDENT IN AN ORGANIZED HEALTH CARE EDUCATION/TRAINING PROGRAM

## 2023-07-20 PROCEDURE — 99284 PR EMERGENCY DEPT VISIT,LEVEL IV: ICD-10-PCS | Mod: 25,,, | Performed by: OBSTETRICS & GYNECOLOGY

## 2023-07-20 PROCEDURE — 27200710 HC EPIDURAL INFUSION PUMP SET: Performed by: ANESTHESIOLOGY

## 2023-07-20 PROCEDURE — 62326 NJX INTERLAMINAR LMBR/SAC: CPT

## 2023-07-20 PROCEDURE — 59025 PR FETAL 2N-STRESS TEST: ICD-10-PCS | Mod: 26,,, | Performed by: OBSTETRICS & GYNECOLOGY

## 2023-07-20 PROCEDURE — 59409 PRA ETRICAL CARE,VAG DELIV ONLY: ICD-10-PCS | Mod: AA,,, | Performed by: ANESTHESIOLOGY

## 2023-07-20 PROCEDURE — 86900 BLOOD TYPING SEROLOGIC ABO: CPT | Performed by: OBSTETRICS & GYNECOLOGY

## 2023-07-20 PROCEDURE — 59409 OBSTETRICAL CARE: CPT | Mod: AA,,, | Performed by: ANESTHESIOLOGY

## 2023-07-20 PROCEDURE — 59400 PR FULL ROUT OBSTE CARE,VAGINAL DELIV: ICD-10-PCS | Mod: GB,,, | Performed by: OBSTETRICS & GYNECOLOGY

## 2023-07-20 PROCEDURE — 51702 INSERT TEMP BLADDER CATH: CPT

## 2023-07-20 PROCEDURE — 63600175 PHARM REV CODE 636 W HCPCS

## 2023-07-20 PROCEDURE — 11000001 HC ACUTE MED/SURG PRIVATE ROOM

## 2023-07-20 PROCEDURE — 99285 EMERGENCY DEPT VISIT HI MDM: CPT | Mod: 25,27

## 2023-07-20 PROCEDURE — 85025 COMPLETE CBC W/AUTO DIFF WBC: CPT | Performed by: OBSTETRICS & GYNECOLOGY

## 2023-07-20 PROCEDURE — 76818 FETAL BIOPHYS PROFILE W/NST: CPT

## 2023-07-20 RX ORDER — CARBOPROST TROMETHAMINE 250 UG/ML
250 INJECTION, SOLUTION INTRAMUSCULAR
Status: DISCONTINUED | OUTPATIENT
Start: 2023-07-20 | End: 2023-07-20

## 2023-07-20 RX ORDER — ONDANSETRON 8 MG/1
8 TABLET, ORALLY DISINTEGRATING ORAL EVERY 8 HOURS PRN
Status: DISCONTINUED | OUTPATIENT
Start: 2023-07-20 | End: 2023-07-22 | Stop reason: HOSPADM

## 2023-07-20 RX ORDER — FAMOTIDINE 10 MG/ML
20 INJECTION INTRAVENOUS ONCE
Status: DISCONTINUED | OUTPATIENT
Start: 2023-07-20 | End: 2023-07-20

## 2023-07-20 RX ORDER — OXYTOCIN 10 [USP'U]/ML
10 INJECTION, SOLUTION INTRAMUSCULAR; INTRAVENOUS ONCE AS NEEDED
Status: DISCONTINUED | OUTPATIENT
Start: 2023-07-20 | End: 2023-07-20

## 2023-07-20 RX ORDER — METHYLERGONOVINE MALEATE 0.2 MG/ML
200 INJECTION INTRAVENOUS
Status: DISCONTINUED | OUTPATIENT
Start: 2023-07-20 | End: 2023-07-20

## 2023-07-20 RX ORDER — HYDROCODONE BITARTRATE AND ACETAMINOPHEN 5; 325 MG/1; MG/1
1 TABLET ORAL EVERY 4 HOURS PRN
Status: DISCONTINUED | OUTPATIENT
Start: 2023-07-20 | End: 2023-07-22 | Stop reason: HOSPADM

## 2023-07-20 RX ORDER — PRENATAL WITH FERROUS FUM AND FOLIC ACID 3080; 920; 120; 400; 22; 1.84; 3; 20; 10; 1; 12; 200; 27; 25; 2 [IU]/1; [IU]/1; MG/1; [IU]/1; MG/1; MG/1; MG/1; MG/1; MG/1; MG/1; UG/1; MG/1; MG/1; MG/1; MG/1
1 TABLET ORAL DAILY
Status: DISCONTINUED | OUTPATIENT
Start: 2023-07-21 | End: 2023-07-22 | Stop reason: HOSPADM

## 2023-07-20 RX ORDER — TRANEXAMIC ACID 10 MG/ML
1000 INJECTION, SOLUTION INTRAVENOUS ONCE AS NEEDED
Status: DISCONTINUED | OUTPATIENT
Start: 2023-07-20 | End: 2023-07-20

## 2023-07-20 RX ORDER — DIPHENOXYLATE HYDROCHLORIDE AND ATROPINE SULFATE 2.5; .025 MG/1; MG/1
1 TABLET ORAL 4 TIMES DAILY PRN
Status: DISCONTINUED | OUTPATIENT
Start: 2023-07-20 | End: 2023-07-22 | Stop reason: HOSPADM

## 2023-07-20 RX ORDER — PROCHLORPERAZINE EDISYLATE 5 MG/ML
5 INJECTION INTRAMUSCULAR; INTRAVENOUS EVERY 6 HOURS PRN
Status: DISCONTINUED | OUTPATIENT
Start: 2023-07-20 | End: 2023-07-20

## 2023-07-20 RX ORDER — CARBOPROST TROMETHAMINE 250 UG/ML
250 INJECTION, SOLUTION INTRAMUSCULAR
Status: DISCONTINUED | OUTPATIENT
Start: 2023-07-20 | End: 2023-07-22 | Stop reason: HOSPADM

## 2023-07-20 RX ORDER — DOCUSATE SODIUM 100 MG/1
200 CAPSULE, LIQUID FILLED ORAL 2 TIMES DAILY PRN
Status: DISCONTINUED | OUTPATIENT
Start: 2023-07-20 | End: 2023-07-22 | Stop reason: HOSPADM

## 2023-07-20 RX ORDER — DIPHENHYDRAMINE HCL 25 MG
25 CAPSULE ORAL EVERY 4 HOURS PRN
Status: DISCONTINUED | OUTPATIENT
Start: 2023-07-20 | End: 2023-07-22 | Stop reason: HOSPADM

## 2023-07-20 RX ORDER — MISOPROSTOL 200 UG/1
800 TABLET ORAL ONCE AS NEEDED
Status: DISCONTINUED | OUTPATIENT
Start: 2023-07-20 | End: 2023-07-22 | Stop reason: HOSPADM

## 2023-07-20 RX ORDER — OXYTOCIN/RINGER'S LACTATE 30/500 ML
334 PLASTIC BAG, INJECTION (ML) INTRAVENOUS ONCE
Status: COMPLETED | OUTPATIENT
Start: 2023-07-20 | End: 2023-07-20

## 2023-07-20 RX ORDER — OXYTOCIN/RINGER'S LACTATE 30/500 ML
334 PLASTIC BAG, INJECTION (ML) INTRAVENOUS ONCE AS NEEDED
Status: DISCONTINUED | OUTPATIENT
Start: 2023-07-20 | End: 2023-07-20

## 2023-07-20 RX ORDER — IBUPROFEN 600 MG/1
600 TABLET ORAL EVERY 6 HOURS
Status: DISCONTINUED | OUTPATIENT
Start: 2023-07-20 | End: 2023-07-22 | Stop reason: HOSPADM

## 2023-07-20 RX ORDER — OXYTOCIN/RINGER'S LACTATE 30/500 ML
95 PLASTIC BAG, INJECTION (ML) INTRAVENOUS ONCE AS NEEDED
Status: DISCONTINUED | OUTPATIENT
Start: 2023-07-20 | End: 2023-07-20

## 2023-07-20 RX ORDER — DIPHENHYDRAMINE HYDROCHLORIDE 50 MG/ML
25 INJECTION INTRAMUSCULAR; INTRAVENOUS EVERY 4 HOURS PRN
Status: DISCONTINUED | OUTPATIENT
Start: 2023-07-20 | End: 2023-07-22 | Stop reason: HOSPADM

## 2023-07-20 RX ORDER — ONDANSETRON 8 MG/1
8 TABLET, ORALLY DISINTEGRATING ORAL EVERY 8 HOURS PRN
Status: DISCONTINUED | OUTPATIENT
Start: 2023-07-20 | End: 2023-07-20

## 2023-07-20 RX ORDER — TRANEXAMIC ACID 10 MG/ML
1000 INJECTION, SOLUTION INTRAVENOUS ONCE AS NEEDED
Status: DISCONTINUED | OUTPATIENT
Start: 2023-07-20 | End: 2023-07-22 | Stop reason: HOSPADM

## 2023-07-20 RX ORDER — OXYTOCIN 10 [USP'U]/ML
10 INJECTION, SOLUTION INTRAMUSCULAR; INTRAVENOUS ONCE AS NEEDED
Status: DISCONTINUED | OUTPATIENT
Start: 2023-07-20 | End: 2023-07-22 | Stop reason: HOSPADM

## 2023-07-20 RX ORDER — DIPHENHYDRAMINE HYDROCHLORIDE 50 MG/ML
25 INJECTION INTRAMUSCULAR; INTRAVENOUS ONCE
Status: COMPLETED | OUTPATIENT
Start: 2023-07-20 | End: 2023-07-20

## 2023-07-20 RX ORDER — HYDROCORTISONE 25 MG/G
CREAM TOPICAL 3 TIMES DAILY PRN
Status: DISCONTINUED | OUTPATIENT
Start: 2023-07-20 | End: 2023-07-22 | Stop reason: HOSPADM

## 2023-07-20 RX ORDER — ACETAMINOPHEN 325 MG/1
650 TABLET ORAL EVERY 6 HOURS PRN
Status: DISCONTINUED | OUTPATIENT
Start: 2023-07-20 | End: 2023-07-22 | Stop reason: HOSPADM

## 2023-07-20 RX ORDER — SIMETHICONE 80 MG
1 TABLET,CHEWABLE ORAL EVERY 6 HOURS PRN
Status: DISCONTINUED | OUTPATIENT
Start: 2023-07-20 | End: 2023-07-22 | Stop reason: HOSPADM

## 2023-07-20 RX ORDER — LIDOCAINE HYDROCHLORIDE AND EPINEPHRINE 15; 5 MG/ML; UG/ML
INJECTION, SOLUTION EPIDURAL
Status: DISCONTINUED | OUTPATIENT
Start: 2023-07-20 | End: 2023-07-20

## 2023-07-20 RX ORDER — OXYTOCIN/RINGER'S LACTATE 30/500 ML
95 PLASTIC BAG, INJECTION (ML) INTRAVENOUS ONCE
Status: DISCONTINUED | OUTPATIENT
Start: 2023-07-20 | End: 2023-07-22 | Stop reason: HOSPADM

## 2023-07-20 RX ORDER — FENTANYL/BUPIVACAINE/NS/PF 2MCG/ML-.1
PLASTIC BAG, INJECTION (ML) INJECTION
Status: DISCONTINUED | OUTPATIENT
Start: 2023-07-20 | End: 2023-07-20

## 2023-07-20 RX ORDER — OXYTOCIN/RINGER'S LACTATE 30/500 ML
334 PLASTIC BAG, INJECTION (ML) INTRAVENOUS ONCE AS NEEDED
Status: DISCONTINUED | OUTPATIENT
Start: 2023-07-20 | End: 2023-07-22 | Stop reason: HOSPADM

## 2023-07-20 RX ORDER — CALCIUM CARBONATE 200(500)MG
500 TABLET,CHEWABLE ORAL 3 TIMES DAILY PRN
Status: DISCONTINUED | OUTPATIENT
Start: 2023-07-20 | End: 2023-07-20

## 2023-07-20 RX ORDER — SODIUM CHLORIDE 0.9 % (FLUSH) 0.9 %
10 SYRINGE (ML) INJECTION
Status: DISCONTINUED | OUTPATIENT
Start: 2023-07-20 | End: 2023-07-22 | Stop reason: HOSPADM

## 2023-07-20 RX ORDER — PRENATAL WITH FERROUS FUM AND FOLIC ACID 3080; 920; 120; 400; 22; 1.84; 3; 20; 10; 1; 12; 200; 27; 25; 2 [IU]/1; [IU]/1; MG/1; [IU]/1; MG/1; MG/1; MG/1; MG/1; MG/1; MG/1; UG/1; MG/1; MG/1; MG/1; MG/1
1 TABLET ORAL DAILY
Status: DISCONTINUED | OUTPATIENT
Start: 2023-07-20 | End: 2023-07-20

## 2023-07-20 RX ORDER — OXYTOCIN/RINGER'S LACTATE 30/500 ML
0-32 PLASTIC BAG, INJECTION (ML) INTRAVENOUS CONTINUOUS
Status: DISCONTINUED | OUTPATIENT
Start: 2023-07-20 | End: 2023-07-20

## 2023-07-20 RX ORDER — FENTANYL/BUPIVACAINE/NS/PF 2MCG/ML-.1
PLASTIC BAG, INJECTION (ML) INJECTION CONTINUOUS PRN
Status: DISCONTINUED | OUTPATIENT
Start: 2023-07-20 | End: 2023-07-20

## 2023-07-20 RX ORDER — SODIUM CHLORIDE 9 MG/ML
INJECTION, SOLUTION INTRAVENOUS
Status: DISCONTINUED | OUTPATIENT
Start: 2023-07-20 | End: 2023-07-20

## 2023-07-20 RX ORDER — PROCHLORPERAZINE EDISYLATE 5 MG/ML
5 INJECTION INTRAMUSCULAR; INTRAVENOUS EVERY 6 HOURS PRN
Status: DISCONTINUED | OUTPATIENT
Start: 2023-07-20 | End: 2023-07-22 | Stop reason: HOSPADM

## 2023-07-20 RX ORDER — HYDROCODONE BITARTRATE AND ACETAMINOPHEN 10; 325 MG/1; MG/1
1 TABLET ORAL EVERY 4 HOURS PRN
Status: DISCONTINUED | OUTPATIENT
Start: 2023-07-20 | End: 2023-07-22 | Stop reason: HOSPADM

## 2023-07-20 RX ORDER — SIMETHICONE 80 MG
1 TABLET,CHEWABLE ORAL 4 TIMES DAILY PRN
Status: DISCONTINUED | OUTPATIENT
Start: 2023-07-20 | End: 2023-07-20

## 2023-07-20 RX ORDER — MISOPROSTOL 200 UG/1
800 TABLET ORAL
Status: DISCONTINUED | OUTPATIENT
Start: 2023-07-20 | End: 2023-07-22 | Stop reason: HOSPADM

## 2023-07-20 RX ORDER — LIDOCAINE HYDROCHLORIDE 10 MG/ML
10 INJECTION INFILTRATION; PERINEURAL ONCE AS NEEDED
Status: DISCONTINUED | OUTPATIENT
Start: 2023-07-20 | End: 2023-07-20

## 2023-07-20 RX ORDER — OXYTOCIN/RINGER'S LACTATE 30/500 ML
95 PLASTIC BAG, INJECTION (ML) INTRAVENOUS ONCE
Status: DISCONTINUED | OUTPATIENT
Start: 2023-07-20 | End: 2023-07-20

## 2023-07-20 RX ORDER — MISOPROSTOL 200 UG/1
800 TABLET ORAL ONCE AS NEEDED
Status: DISCONTINUED | OUTPATIENT
Start: 2023-07-20 | End: 2023-07-20

## 2023-07-20 RX ORDER — SODIUM CHLORIDE, SODIUM LACTATE, POTASSIUM CHLORIDE, CALCIUM CHLORIDE 600; 310; 30; 20 MG/100ML; MG/100ML; MG/100ML; MG/100ML
INJECTION, SOLUTION INTRAVENOUS CONTINUOUS
Status: DISCONTINUED | OUTPATIENT
Start: 2023-07-20 | End: 2023-07-20

## 2023-07-20 RX ORDER — METHYLERGONOVINE MALEATE 0.2 MG/ML
200 INJECTION INTRAVENOUS
Status: DISCONTINUED | OUTPATIENT
Start: 2023-07-20 | End: 2023-07-22 | Stop reason: HOSPADM

## 2023-07-20 RX ORDER — FENTANYL/BUPIVACAINE/NS/PF 2MCG/ML-.1
PLASTIC BAG, INJECTION (ML) INJECTION
Status: COMPLETED
Start: 2023-07-20 | End: 2023-07-20

## 2023-07-20 RX ORDER — DIPHENHYDRAMINE HYDROCHLORIDE 50 MG/ML
25 INJECTION INTRAMUSCULAR; INTRAVENOUS ONCE
Status: DISCONTINUED | OUTPATIENT
Start: 2023-07-20 | End: 2023-07-20

## 2023-07-20 RX ORDER — SODIUM CITRATE AND CITRIC ACID MONOHYDRATE 334; 500 MG/5ML; MG/5ML
30 SOLUTION ORAL ONCE
Status: DISCONTINUED | OUTPATIENT
Start: 2023-07-20 | End: 2023-07-20

## 2023-07-20 RX ORDER — OXYTOCIN/RINGER'S LACTATE 30/500 ML
95 PLASTIC BAG, INJECTION (ML) INTRAVENOUS ONCE AS NEEDED
Status: DISCONTINUED | OUTPATIENT
Start: 2023-07-20 | End: 2023-07-22 | Stop reason: HOSPADM

## 2023-07-20 RX ORDER — FENTANYL/BUPIVACAINE/NS/PF 2MCG/ML-.1
PLASTIC BAG, INJECTION (ML) INJECTION CONTINUOUS
Status: DISCONTINUED | OUTPATIENT
Start: 2023-07-20 | End: 2023-07-20

## 2023-07-20 RX ADMIN — IBUPROFEN 600 MG: 600 TABLET ORAL at 07:07

## 2023-07-20 RX ADMIN — Medication 10 ML/HR: at 10:07

## 2023-07-20 RX ADMIN — LIDOCAINE HYDROCHLORIDE,EPINEPHRINE BITARTRATE 3 ML: 15; .005 INJECTION, SOLUTION EPIDURAL; INFILTRATION; INTRACAUDAL; PERINEURAL at 10:07

## 2023-07-20 RX ADMIN — Medication 2 ML: at 10:07

## 2023-07-20 RX ADMIN — Medication 334 MILLI-UNITS/MIN: at 03:07

## 2023-07-20 RX ADMIN — Medication 4 MILLI-UNITS/MIN: at 12:07

## 2023-07-20 RX ADMIN — DIPHENHYDRAMINE HYDROCHLORIDE 25 MG: 50 INJECTION, SOLUTION INTRAMUSCULAR; INTRAVENOUS at 01:07

## 2023-07-20 NOTE — PROGRESS NOTES
LABOR NOTE    S:  Complaints: No.  Epidural working:  yes  MD to bedside for routine cervical check    O: /86   Pulse 67   Temp 97.6 °F (36.4 °C) (Oral)   Resp 18   LMP 10/19/2022   SpO2 100%   Breastfeeding No     FHT: 135bpm; moderate variability; +accels/-decels; Cat 1 (reassuring)  CTX: q 2-4, pit @4  SVE: 6/90/-1, SROM clr    PLAN:  0730: 1.5/70/-2  1130: 3/80/-2, polk placed  1415: 6/90/-1, SROM clear, polk out    Continue Close Maternal/Fetal Monitoring  Recheck 2 hours or PRN      Damaris Avalos MD  [facility]   OBGYN PGY2

## 2023-07-20 NOTE — ANESTHESIA PROCEDURE NOTES
CSE    Patient location during procedure: OB   Reason for block: primary anesthetic   Reason for block: labor analgesia requested by patient and obstetrician  Diagnosis: IUP   Start time: 7/20/2023 10:10 AM  Timeout: 7/20/2023 10:10 AM  End time: 7/20/2023 10:15 AM  Surgery related to: Vaginal Delivery    Staffing  Performing Provider: Chaparro Sherman MD  Authorizing Provider: Malick Patel III, MD        Preanesthetic Checklist  Completed: patient identified, IV checked, site marked, risks and benefits discussed, surgical consent, monitors and equipment checked, pre-op evaluation, timeout performed, anesthesia consent given, hand hygiene performed and patient being monitored  Preparation  Patient position: sitting  Prep: ChloraPrep  Patient monitoring: Pulse Ox  Reason for block: primary anesthetic   Epidural  Skin Anesthetic: lidocaine 1%  Skin Wheal: 3 mL  Administration type: continuous  Approach: midline  Interspace: L3-4    Injection technique: PHANI saline  Needle and Epidural Catheter  Needle type: Tuohy   Needle gauge: 17  Needle length: 3.5 inches  Needle insertion depth: 5.5 cm  Catheter type: springw[employer]"  Catheter size: 19 G  Catheter at skin depth: 10 cm  Insertion Attempts: 1  Test dose: 3 mL of lidocaine 1.5% with Epi 1-to-200,000  Additional Documentation: incremental injection, negative aspiration for heme and CSF, no paresthesia on injection, no signs/symptoms of IV or SA injection, no significant pain on injection and no significant complaints from patient  Needle localization: anatomical landmarks  Medications:  Volume per aspiration: 5 mL  Time between aspirations: 5 minutes   Assessment  Ease of block: easy  Patient's tolerance of the procedure: comfortable throughout block and no complaints  Additional Notes  CSE No inadvertent dural puncture with Tuohy.  Dural puncture performed with spinal needle.

## 2023-07-20 NOTE — ED PROVIDER NOTES
Encounter Date: 2023       History     Chief Complaint   Patient presents with    Contractions     HPI  Paz Bentley is a 28 y.o. [id number] at 39w2d presents complaining of contractions that have not improved since discharge from ED earlier this morning.   This IUP is uncomplicated. Patient reports contractions, denies vaginal bleeding, denies LOF.   Fetal Movement: normal.      Review of patient's allergies indicates:  No Known Allergies  No past medical history on file.  No past surgical history on file.  Family History   Problem Relation Age of Onset    Breast cancer Maternal Grandmother     Colon cancer Neg Hx     Ovarian cancer Neg Hx      Social History     Tobacco Use    Smoking status: Never    Smokeless tobacco: Never   Substance Use Topics    Alcohol use: Not Currently    Drug use: No     Review of Systems   Constitutional:  Negative for chills and fever.   HENT:  Negative for sore throat.    Eyes:  Negative for visual disturbance.   Respiratory:  Negative for shortness of breath.    Cardiovascular:  Negative for chest pain.   Gastrointestinal:  Positive for abdominal pain. Negative for nausea and vomiting.   Genitourinary:  Negative for dysuria, vaginal bleeding and vaginal discharge.   Musculoskeletal:  Negative for back pain.   Skin:  Negative for rash.   Neurological:  Negative for weakness and headaches.   Hematological:  Does not bruise/bleed easily.   Psychiatric/Behavioral:  The patient is not nervous/anxious.      Physical Exam     Initial Vitals   BP Pulse Resp Temp SpO2   23 0700 23 0700 23 0740 23 0740 23 0700   113/69 70 18 97.6 °F (36.4 °C) 100 %      MAP       --                Physical Exam    Vitals reviewed.  Constitutional: She appears well-developed.   HENT:   Head: Normocephalic.   Eyes: EOM are normal.   Cardiovascular:  Normal rate.           Pulmonary/Chest: No respiratory distress.   Abdominal: Abdomen is soft. There is no abdominal tenderness.      Neurological: She is alert and oriented to person, place, and time.   Skin: Skin is warm. Capillary refill takes less than 2 seconds.   Psychiatric: Her behavior is normal.     OB LABOR EXAM:                       Comments: SVE @ 0530: .3  Recheck: 0730 .2     ED Course   Fetal non-stress test    Date/Time: 2023 6:44 AM  Performed by: Jinny Ochoa MD  Authorized by: Jinny Ochoa MD     Nonstress Test:     Variability:  6-25 BPM    Decelerations:  None    Accelerations:  15 bpm    Baseline:  125    Contractions:  Irregular    Contraction Frequency:  Q4-5 min  Biophysical Profile:     Nonstress Test Interpretation: reactive      Overall Impression:  Reassuring  Post-procedure:     Patient tolerance:  Patient tolerated the procedure well with no immediate complications  Labs Reviewed   CBC W/ AUTO DIFFERENTIAL - Abnormal; Notable for the following components:       Result Value    Hemoglobin 10.4 (*)     Hematocrit 33.6 (*)     MCV 80 (*)     MCH 24.8 (*)     MCHC 31.0 (*)     RDW 14.8 (*)     Immature Granulocytes 0.7 (*)     Gran # (ANC) 9.3 (*)     Immature Grans (Abs) 0.08 (*)     Mono # 1.1 (*)     Gran % 78.1 (*)     Lymph % 11.9 (*)     All other components within normal limits   TYPE & SCREEN          Imaging Results    None              Medications - No data to display  Medical Decision Making:   Initial Assessment:   [id number] at 39w2d presents complaining of contractions that have not improved since discharge from ED earlier this morning.   ED Management:  VSS  NST: reactive and reassurring  On continuous monitoring, occ decelerations, but overall reassuring, Cat 2  TOCO: irregular contractions  SVE: .-3  Repeat SVE: .2    Will admit to L&D for IOL          Attending Attestation:   Physician Attestation Statement for Resident:  As the supervising MD   Physician Attestation Statement: I have personally seen and examined this patient.   I agree with the above history.  -:    As the supervising MD I agree with the above PE.     As the supervising MD I agree with the above treatment, course, plan, and disposition.   I was personally present during the critical portions of the procedure(s) performed by the resident and was immediately available in the ED to provide services and assistance as needed during the entire procedure.                             Clinical Impression:   Final diagnoses:  [O47.9] Uterine contractions during pregnancy (Primary)  [Z3A.39] 39 weeks gestation of pregnancy        ED Disposition Condition    Admit Stable             Tosha Ireland MD  Obstetrics and Gynecology - PGY1       Tosha Irleand MD  Resident  07/20/23 0973       Socorro Goode MD  07/20/23 3511

## 2023-07-20 NOTE — L&D DELIVERY NOTE
[facility] - Labor & Delivery  Vaginal Delivery   Operative Note    SUMMARY   Vertex presentation.   Patient was under epidural anesthesia.  Called to patients room by RN, who stated that the patient was complete and +2. Upon placing the patient in the dorsal lithotomy position, the patient was found to be .    after precipitous delivery  Infant delivered OA over intact perineum.  Male infant also tolerated the delivery well and was placed on mother's abdomen for skin-to-skin contact.   Cord clamping was delayed until pulse was diminished to palpation in the cord.  Cord was then clamped and cut, and umbilical cord blood was obtained.  Gentle traction on the umbilical cord yielded delivery of the placenta, and IV pitocin was started.   Bimanual uterine massage confirmed good uterine tone.   Left labial laceration was repaired with 3-0 vicryl in the usual fashion and was then found to be hemostatic.  Uterine tone noted again.   Patient and infant tolerated delivery well.   mL.  S/L/N counts correct x 2.    Tosha Ireland MD  Obstetrics and Gynecology - PGY1        Indications:  (spontaneous vaginal delivery)  Pregnancy complicated by:   Patient Active Problem List   Diagnosis    Acne    BV (bacterial vaginosis)    Well woman exam    Contact with and (suspected) exposure to covid-19    Screening for other specific viral and chlamydial diseases    Encounter for induction of labor     (spontaneous vaginal delivery)     Admitting GA: 39w2d    Delivery Information for Vivek Bentley    Birth information:  YOB: 2021   Time of birth: 3:43 PM   Sex: male   Head Delivery Date/Time: 2023  3:43 PM   Delivery type: Vaginal, Spontaneous   Gestational Age: 39w2d        Delivery Providers    Delivering clinician: Luis Avina MD   Provider Role    Tosha Ireland MD 1st Call Resident    Yael Carbajal RN Delivery Nurse    Yenny Mcgovern RN Charge Nurse    Aysha Kraft RN Delivery  "Nurse    [facility]              Measurements    Weight: 3280 g  Weight (lbs): 7 lb 3.7 oz  Length: 47.6 cm  Length (in): 18.75"  Head circumference: 34.3 cm  Chest circumference: 33 cm         Apgars    Living status: Living  Apgars:  1 min.:  5 min.:  10 min.:  15 min.:  20 min.:    Skin color:  1  1       Heart rate:  2  2       Reflex irritability:  2  2       Muscle tone:  2  2       Respiratory effort:  2  2       Total:  9  9       Apgars assigned by: SUSAN FENG RN         Operative Delivery    Forceps attempted?: No  Vacuum extractor attempted?: No         Shoulder Dystocia    Shoulder dystocia present?: No           Presentation    Presentation: Vertex  Position: Occiput Anterior           Interventions/Resuscitation    Method: Tactile Stimulation       Cord    Vessels: 3 vessels  Complications: None  Delayed Cord Clamping?: Yes  Cord Blood Disposition: Sent with Baby  Gases Sent?: No  Stem Cell Collection (by MD): No       Placenta    Placenta delivery date/time: 2023 1550  Placenta removal: Spontaneous  Placenta appearance: Intact  Placenta disposition: Discarded           Labor Events:       labor: No     Labor Onset Date/Time:         Dilation Complete Date/Time:         Start Pushing Date/Time:         Start Pushing Date/Time:       Rupture Date/Time: 23  1409         Rupture type: SRM (Spontaneous Rupture)         Fluid Amount:       Fluid Color: Clear               steroids: None     Antibiotics given for GBS: No     Induction: balloon dilation (Renae)     Indications for induction:  Elective     Augmentation: oxytocin     Indications for augmentation: Fetal Heart Rate or Rhythm Abnormality     Labor complications: None     Additional complications:          Cervical ripening:                     Delivery:      Episiotomy: None     Indication for Episiotomy:       Perineal Lacerations:   Repaired:      Periurethral Laceration:   Repaired:     Labial " Laceration: left Repaired: Yes   Sulcus Laceration:   Repaired:     Vaginal Laceration:   Repaired:     Cervical Laceration:   Repaired:     Repair suture:       Repair # of packets: 1     Last Value - EBL - Nursing (mL):       Sum - EBL - Nursing (mL): 0     Last Value - EBL - Anesthesia (mL):      Calculated QBL (mL):       Vaginal Sweep Performed: Yes     Surgicount Correct: Yes     Vaginal Packing: No Quantity:       Other providers:       Anesthesia    Method: Epidural          Details (if applicable):  Trial of Labor      Categorization:      Priority:     Indications for :     Incision Type:       Additional  information:  Forceps:    Vacuum:    Breech:    Observed anomalies    Other (Comments):

## 2023-07-20 NOTE — ANESTHESIA PREPROCEDURE EVALUATION
2023  Pre-operative evaluation for * No surgery found *    Paz Bentley is a 28 y.o.  female with IUP at 39w2d weeks gestation who presents with contractions that have not improved since discharge from ED earlier this morning. This IUP is uncomplicated. She denies any history of asthma, back surgery, cardiopulmonary disease or disorders of coagulation.     Patient Active Problem List   Diagnosis    Acne    BV (bacterial vaginosis)    Well woman exam    Contact with and (suspected) exposure to covid-19    Screening for other specific viral and chlamydial diseases    Encounter for induction of labor       Review of patient's allergies indicates:  No Known Allergies    No current facility-administered medications on file prior to encounter.     Current Outpatient Medications on File Prior to Encounter   Medication Sig Dispense Refill    prenatal vit/iron fum/folic ac (RIGHT STEP PRENATAL VITAMINS ORAL) Take by mouth.         No past surgical history on file.    Social History     Socioeconomic History    Marital status: Single   Tobacco Use    Smoking status: Never    Smokeless tobacco: Never   Substance and Sexual Activity    Alcohol use: Not Currently    Drug use: No    Sexual activity: Yes     Partners: Male     Birth control/protection: None     Social Determinants of Health     Financial Resource Strain: Medium Risk    Difficulty of Paying Living Expenses: Somewhat hard   Food Insecurity: No Food Insecurity    Worried About Running Out of Food in the Last Year: Never true    Ran Out of Food in the Last Year: Never true   Transportation Needs: No Transportation Needs    Lack of Transportation (Medical): No    Lack of Transportation (Non-Medical): No   Stress: No Stress Concern Present    Feeling of Stress : Not at all   Social Connections: Unknown    Marital Status: Never     Housing Stability: Unknown    Unable to Pay for Housing in the Last Year: No    Unstable Housing in the Last Year: No         CBC:   Recent Labs     07/20/23  0851   WBC 11.94   RBC 4.20   HGB 10.4*   HCT 33.6*      MCV 80*   MCH 24.8*   MCHC 31.0*       CMP: No results for input(s): NA, K, CL, CO2, BUN, CREATININE, GLU, MG, PHOS, CALCIUM, ALBUMIN, PROT, ALKPHOS, ALT, AST, BILITOT in the last 72 hours.    INR  No results for input(s): PT, INR, PROTIME, APTT in the last 72 hours.        Diagnostic Studies:      EKG:  Normal sinus rhythm   Low voltage QRS   Borderline Abnormal ECG   When compared with ECG of 27-JUN-2016 12:25,   No significant change was found   Confirmed by ALEXANDRA FIGUEROA MD ([id number]) on 6/6/2023 9:26:21 PM     2D Echo:  No results found for this or any previous visit.        Pre-op Assessment    I have reviewed the Patient Summary Reports.     I have reviewed the Nursing Notes. I have reviewed the NPO Status.      Review of Systems  Hematology/Oncology:     Oncology Normal    -- Anemia:   EENT/Dental:EENT/Dental Normal   Cardiovascular:  Cardiovascular Normal     Pulmonary:  Pulmonary Normal    Renal/:  Renal/ Normal     Hepatic/GI:  Hepatic/GI Normal    Musculoskeletal:  Musculoskeletal Normal    Neurological:  Neurology Normal    Endocrine:  Endocrine Normal    Dermatological:  Skin Normal    Psych:  Psychiatric Normal           Physical Exam  General: Well nourished, Cooperative, Alert and Oriented    Airway:  Mallampati: II   Mouth Opening: Normal  Neck ROM: Normal ROM    Dental:  Braces        Anesthesia Plan  Type of Anesthesia, risks & benefits discussed:    Anesthesia Type: Epidural  Intra-op Monitoring Plan: Standard ASA Monitors  Informed Consent: Informed consent signed with the Patient and all parties understand the risks and agree with anesthesia plan.  All questions answered.   ASA Score: 2  Day of Surgery Review of History & Physical: H&P Update referred to the  surgeon/provider.    Ready For Surgery From Anesthesia Perspective.     .

## 2023-07-20 NOTE — H&P
HISTORY AND PHYSICAL                                                OBSTETRICS          Subjective:       Paz Bentley is a 28 y.o.  female with IUP at 39w2d weeks gestation who presents with contractions that have not improved since discharge from ED earlier this morning.    Patient reports contractions, denies vaginal bleeding, denies LOF.   Fetal Movement: normal.    This IUP is uncomplicated.    Review of Systems   Constitutional:  Negative for chills and fever.   HENT:  Negative for nasal congestion.    Eyes:  Negative for visual disturbance.   Respiratory:  Negative for cough and shortness of breath.    Cardiovascular:  Negative for chest pain and palpitations.   Gastrointestinal:  Positive for abdominal pain (contractions). Negative for diarrhea, nausea and vomiting.   Genitourinary:  Negative for dysuria, hematuria and vaginal bleeding.   Musculoskeletal:  Negative for back pain.   Neurological:  Negative for headaches.     PMHx: No past medical history on file.    PSHx: No past surgical history on file.    All: Review of patient's allergies indicates:  No Known Allergies    Meds:   Medications Prior to Admission   Medication Sig Dispense Refill Last Dose    prenatal vit/iron fum/folic ac (RIGHT STEP PRENATAL VITAMINS ORAL) Take by mouth.          SH:   Social History     Socioeconomic History    Marital status: Single   Tobacco Use    Smoking status: Never    Smokeless tobacco: Never   Substance and Sexual Activity    Alcohol use: Not Currently    Drug use: No    Sexual activity: Yes     Partners: Male     Birth control/protection: None     Social Determinants of Health     Financial Resource Strain: Medium Risk    Difficulty of Paying Living Expenses: Somewhat hard   Food Insecurity: No Food Insecurity    Worried About Running Out of Food in the Last Year: Never true    Ran Out of Food in the Last Year: Never true   Transportation Needs: No Transportation Needs    Lack of Transportation  (Medical): No    Lack of Transportation (Non-Medical): No   Stress: No Stress Concern Present    Feeling of Stress : Not at all   Social Connections: Unknown    Marital Status: Never    Housing Stability: Unknown    Unable to Pay for Housing in the Last Year: No    Unstable Housing in the Last Year: No       FH:   Family History   Problem Relation Age of Onset    Breast cancer Maternal Grandmother     Colon cancer Neg Hx     Ovarian cancer Neg Hx        OBHx:   OB History    Para Term  AB Living   2 0 0 0 1 0   SAB IAB Ectopic Multiple Live Births   0 0 0 0 0      # Outcome Date GA Lbr Ziggy/2nd Weight Sex Delivery Anes PTL Lv   2 Current            1 AB               Obstetric Comments   Menarche at 14       Objective:       /78   Pulse 87   Temp 97.6 °F (36.4 °C) (Oral)   Resp 18   LMP 10/19/2022   SpO2 100%   Breastfeeding No     Vitals:    23 0747 23 0850 23 0851 23 0855   BP: 117/78      Pulse: 83 92 79 87   Resp:       Temp:       TempSrc:       SpO2:  100%  100%       General: NAD, alert, oriented, cooperative  HEENT: NCAT, EOM grossly intact  Lungs: Normal WOB  Heart: regular rate  Abdomen: gravid, soft, nondistended, nontender, no rebound or guarding  Extremities: trace edema    FHT: 125 bpm, moderate variability, +accels, +decels; Cat 2 (overall reassuring)  [place]: irregular  Presentation: cephalic by ultrasound    Cervix: 1.5/70/-2    EFW by Leopold's: 6#    Maternal pelvis unproven    no recent growth scan    Lab Review  Blood Type A POS  GBBS: negative  Rubella: Immune  RPR: NR  HIV: negative  HepB: negative       Assessment:       39w2d weeks gestation with induction of labor due to term pregnancy, maternal contractions, and fetal decelerations    Active Hospital Problems    Diagnosis  POA    *Encounter for induction of labor [Z34.90]  Not Applicable      Resolved Hospital Problems   No resolved problems to display.          Plan:     Induction of  Labor   Risks, benefits, alternatives and possible complications have been discussed in detail with the patient.   - Consents signed and to chart  - Admit to Labor and Delivery unit  - Epidural per Anesthesia  - Draw CBC, T&S  - Notify Staff  - Recheck in 2-4 hrs or PRN  - Post-Partum Hemorrhage risk - low  - Postpartum lovenox: No  - Contraception: ASK  - Plan for polk balloon, cytotec      Tosha Ireland MD  Obstetrics and Gynecology - PGY1

## 2023-07-20 NOTE — PROGRESS NOTES
LABOR NOTE    S:  Complaints: No.  Epidural working:  yes  MD to bedside for routine cervical check and to place polk balloon    O: /78   Pulse 87   Temp 97.6 °F (36.4 °C) (Oral)   Resp 18   LMP 10/19/2022   SpO2 100%   Breastfeeding No     FHT: 120bpm; moderate variability; +accels/-decels; Cat 1 (reassuring)  CTX: q 3-7 minutes  SVE: 3/80/-2    PLAN:  0730: 1.5/70/-2  1130: 3/80/-2, polk placed    Continue Close Maternal/Fetal Monitoring  Will initiate pitocin at this time  Recheck 2-4 hours or PRN    Tosha Ireland MD  Obstetrics and Gynecology - PGY1

## 2023-07-20 NOTE — PLAN OF CARE
This patient has been screened for Social Work discharge planning needs. Based on  documentation in medical record , no discharge planning needs are anticipated at this time. Should any discharge planning needs arise, please consult .       23 1041   OB SCREEN   Assessment Type Discharge Planning Assessment   Source of Information health record   Received Prenatal Care Yes   Any indications/suspicions for None   Is this a teen pregnancy No   Is the baby in NICU No   Indication for adoption/Safe Haven No   Indication for DME/post-acute needs No   HIV (+) No   Any congenital  disorders No   Fetal demise/ death No     BRANT Wagoner  [phone]

## 2023-07-21 PROBLEM — Z34.90 ENCOUNTER FOR INDUCTION OF LABOR: Status: RESOLVED | Noted: 2023-07-20 | Resolved: 2023-07-21

## 2023-07-21 LAB
BASOPHILS # BLD AUTO: 0.05 K/UL (ref 0–0.2)
BASOPHILS NFR BLD: 0.4 % (ref 0–1.9)
DIFFERENTIAL METHOD: ABNORMAL
EOSINOPHIL # BLD AUTO: 0.1 K/UL (ref 0–0.5)
EOSINOPHIL NFR BLD: 0.5 % (ref 0–8)
ERYTHROCYTE [DISTWIDTH] IN BLOOD BY AUTOMATED COUNT: 14.6 % (ref 11.5–14.5)
HCT VFR BLD AUTO: 30.6 % (ref 37–48.5)
HGB BLD-MCNC: 9.8 G/DL (ref 12–16)
IMM GRANULOCYTES # BLD AUTO: 0.07 K/UL (ref 0–0.04)
IMM GRANULOCYTES NFR BLD AUTO: 0.6 % (ref 0–0.5)
LYMPHOCYTES # BLD AUTO: 2.1 K/UL (ref 1–4.8)
LYMPHOCYTES NFR BLD: 17.7 % (ref 18–48)
MCH RBC QN AUTO: 24.8 PG (ref 27–31)
MCHC RBC AUTO-ENTMCNC: 32 G/DL (ref 32–36)
MCV RBC AUTO: 78 FL (ref 82–98)
MONOCYTES # BLD AUTO: 1.3 K/UL (ref 0.3–1)
MONOCYTES NFR BLD: 10.7 % (ref 4–15)
NEUTROPHILS # BLD AUTO: 8.4 K/UL (ref 1.8–7.7)
NEUTROPHILS NFR BLD: 70.1 % (ref 38–73)
NRBC BLD-RTO: 0 /100 WBC
PLATELET # BLD AUTO: 295 K/UL (ref 150–450)
PMV BLD AUTO: 11.3 FL (ref 9.2–12.9)
RBC # BLD AUTO: 3.95 M/UL (ref 4–5.4)
WBC # BLD AUTO: 11.93 K/UL (ref 3.9–12.7)

## 2023-07-21 PROCEDURE — 72100002 HC LABOR CARE, 1ST 8 HOURS

## 2023-07-21 PROCEDURE — 99024 PR POST-OP FOLLOW-UP VISIT: ICD-10-PCS | Mod: ,,, | Performed by: OBSTETRICS & GYNECOLOGY

## 2023-07-21 PROCEDURE — 99024 POSTOP FOLLOW-UP VISIT: CPT | Mod: ,,, | Performed by: OBSTETRICS & GYNECOLOGY

## 2023-07-21 PROCEDURE — 11000001 HC ACUTE MED/SURG PRIVATE ROOM

## 2023-07-21 PROCEDURE — 25000003 PHARM REV CODE 250: Performed by: STUDENT IN AN ORGANIZED HEALTH CARE EDUCATION/TRAINING PROGRAM

## 2023-07-21 PROCEDURE — 36415 COLL VENOUS BLD VENIPUNCTURE: CPT | Performed by: STUDENT IN AN ORGANIZED HEALTH CARE EDUCATION/TRAINING PROGRAM

## 2023-07-21 PROCEDURE — 85025 COMPLETE CBC W/AUTO DIFF WBC: CPT | Performed by: STUDENT IN AN ORGANIZED HEALTH CARE EDUCATION/TRAINING PROGRAM

## 2023-07-21 PROCEDURE — 72200005 HC VAGINAL DELIVERY LEVEL II

## 2023-07-21 RX ORDER — OXYCODONE AND ACETAMINOPHEN 5; 325 MG/1; MG/1
1 TABLET ORAL EVERY 4 HOURS PRN
Qty: 15 TABLET | Refills: 0 | Status: SHIPPED | OUTPATIENT
Start: 2023-07-21 | End: 2023-09-08

## 2023-07-21 RX ORDER — IBUPROFEN 800 MG/1
800 TABLET ORAL 3 TIMES DAILY
Qty: 30 TABLET | Refills: 1 | Status: SHIPPED | OUTPATIENT
Start: 2023-07-21

## 2023-07-21 RX ADMIN — IBUPROFEN 600 MG: 600 TABLET ORAL at 11:07

## 2023-07-21 RX ADMIN — IBUPROFEN 600 MG: 600 TABLET ORAL at 06:07

## 2023-07-21 RX ADMIN — IBUPROFEN 600 MG: 600 TABLET ORAL at 05:07

## 2023-07-21 RX ADMIN — PRENATAL VIT W/ FE FUMARATE-FA TAB 27-0.8 MG 1 TABLET: 27-0.8 TAB at 08:07

## 2023-07-21 RX ADMIN — HYDROCODONE BITARTRATE AND ACETAMINOPHEN 1 TABLET: 10; 325 TABLET ORAL at 06:07

## 2023-07-21 RX ADMIN — IBUPROFEN 600 MG: 600 TABLET ORAL at 12:07

## 2023-07-21 RX ADMIN — DOCUSATE SODIUM 200 MG: 100 CAPSULE, LIQUID FILLED ORAL at 09:07

## 2023-07-21 RX ADMIN — DOCUSATE SODIUM 200 MG: 100 CAPSULE, LIQUID FILLED ORAL at 08:07

## 2023-07-21 NOTE — ANESTHESIA POSTPROCEDURE EVALUATION
Anesthesia Post Evaluation    Patient: Paz Bentley    Procedure(s) Performed: * No procedures listed *    Final Anesthesia Type: CSE      Patient location during evaluation: labor & delivery  Patient participation: Yes- Able to Participate  Level of consciousness: awake and alert  Post-procedure vital signs: reviewed and stable  Pain management: adequate  Airway patency: patent  KEITH mitigation strategies: Multimodal analgesia  PONV status at discharge: No PONV  Anesthetic complications: no      Cardiovascular status: blood pressure returned to baseline  Respiratory status: unassisted and spontaneous ventilation  Follow-up not needed.          Vitals Value Taken Time   /87 07/20/23 2352   Temp 36.9 °C (98.4 °F) 07/20/23 2352   Pulse 65 07/20/23 2352   Resp 18 07/20/23 2352   SpO2 100 % 07/20/23 2352         No case tracking events are documented in the log.      Pain/[place] Score: Pain Rating Prior to Med Admin: 6 (7/21/2023  5:57 AM)  Pain Rating Post Med Admin: 0 (7/21/2023  1:40 AM)

## 2023-07-21 NOTE — PROGRESS NOTES
POSTPARTUM PROGRESS NOTE    Subjective:     PPD/POD#: 1   Procedure:    EGA: 39w3d   N/V: No   F/C: No   Abd Pain: Mild, well-controlled with oral pain medication   Lochia: Mild   Voiding: Yes   Ambulating: Yes   Bowel fnc: Yes   Breastfeeding: Yes   Contraception: Per primary OB   Circumcision: Consented.  Examined and approved by OB attending.     Objective:      Temp:  [97.7 °F (36.5 °C)-98.4 °F (36.9 °C)] 97.8 °F (36.6 °C)  Pulse:  [65-89] 66  Resp:  [18] 18  SpO2:  [96 %-100 %] 99 %  BP: (109-143)/(58-92) 119/78    Lung: Normal respiratory effort   Abdomen: Soft, appropriately tender   Uterus: Firm, no fundal tenderness   Incision: N/A   : Deferred   Extremities: No edema     Lab Review    No results for input(s): NA, K, CL, CO2, BUN, CREATININE, GLU, PROT, BILITOT, ALKPHOS, ALT, AST, MG, PHOS in the last 168 hours.    Recent Labs   Lab 23  0851 23  0508   WBC 11.94 11.93   HGB 10.4* 9.8*   HCT 33.6* 30.6*   MCV 80* 78*    295         I/O    Intake/Output Summary (Last 24 hours) at 2023 0917  Last data filed at 2023 0500  Gross per 24 hour   Intake --   Output 1500 ml   Net -1500 ml        Assessment and Plan:   Postpartum care:  - Patient doing well.  - Continue routine management and advances.  - Infant examined and cleared for circumcision      Rina Avalos MD,JUVENAL  Date and Time: 2023 9:17 AM  OB Hospitalist

## 2023-07-21 NOTE — LACTATION NOTE
07/21/23 1630   Maternal Assessment   Breast Shape Bilateral:;pendulous   Breast Density Bilateral:;soft   Areola Bilateral:;elastic   Nipples Bilateral:;everted;graspable   Maternal Infant Feeding   Maternal Emotional State relaxed;assist needed   Infant Positioning cross-cradle   Signs of Milk Transfer infant jaw motion present   Pain with Feeding no     LC to room: client stated feedings are going better today, infant latching well. Infant began crying, LC changed diaper, feeding cues noted. S2s initiated with client, LC assisted with latch in L cross-cradle, stated latch felt comfortable after 2 adjustments. EDU provided utilizing breastfeeding guide handout on day 2 and 3 of life, feeding on cue 8 or more times in 24 hours, diaper counts, and how to call for assistance. All questions answered, client verbalized understanding. Extension on whiteboard.

## 2023-07-22 VITALS
HEART RATE: 75 BPM | RESPIRATION RATE: 18 BRPM | DIASTOLIC BLOOD PRESSURE: 75 MMHG | OXYGEN SATURATION: 98 % | SYSTOLIC BLOOD PRESSURE: 117 MMHG | TEMPERATURE: 98 F

## 2023-07-22 LAB
ALBUMIN SERPL BCP-MCNC: 2.4 G/DL (ref 3.5–5.2)
ALP SERPL-CCNC: 98 U/L (ref 55–135)
ALT SERPL W/O P-5'-P-CCNC: 30 U/L (ref 10–44)
ANION GAP SERPL CALC-SCNC: 11 MMOL/L (ref 8–16)
AST SERPL-CCNC: 34 U/L (ref 10–40)
BILIRUB SERPL-MCNC: 0.2 MG/DL (ref 0.1–1)
BUN SERPL-MCNC: 11 MG/DL (ref 6–20)
CALCIUM SERPL-MCNC: 9.2 MG/DL (ref 8.7–10.5)
CHLORIDE SERPL-SCNC: 105 MMOL/L (ref 95–110)
CO2 SERPL-SCNC: 20 MMOL/L (ref 23–29)
CREAT SERPL-MCNC: 0.7 MG/DL (ref 0.5–1.4)
EST. GFR  (NO RACE VARIABLE): >60 ML/MIN/1.73 M^2
GLUCOSE SERPL-MCNC: 95 MG/DL (ref 70–110)
POTASSIUM SERPL-SCNC: 3.5 MMOL/L (ref 3.5–5.1)
PROT SERPL-MCNC: 5.6 G/DL (ref 6–8.4)
SODIUM SERPL-SCNC: 136 MMOL/L (ref 136–145)

## 2023-07-22 PROCEDURE — 99024 POSTOP FOLLOW-UP VISIT: CPT | Mod: ,,, | Performed by: OBSTETRICS & GYNECOLOGY

## 2023-07-22 PROCEDURE — 25000003 PHARM REV CODE 250: Performed by: STUDENT IN AN ORGANIZED HEALTH CARE EDUCATION/TRAINING PROGRAM

## 2023-07-22 PROCEDURE — 80053 COMPREHEN METABOLIC PANEL: CPT | Performed by: OBSTETRICS & GYNECOLOGY

## 2023-07-22 PROCEDURE — 36415 COLL VENOUS BLD VENIPUNCTURE: CPT | Performed by: OBSTETRICS & GYNECOLOGY

## 2023-07-22 PROCEDURE — 99024 PR POST-OP FOLLOW-UP VISIT: ICD-10-PCS | Mod: ,,, | Performed by: OBSTETRICS & GYNECOLOGY

## 2023-07-22 RX ADMIN — DOCUSATE SODIUM 200 MG: 100 CAPSULE, LIQUID FILLED ORAL at 09:07

## 2023-07-22 RX ADMIN — IBUPROFEN 600 MG: 600 TABLET ORAL at 05:07

## 2023-07-22 RX ADMIN — IBUPROFEN 600 MG: 600 TABLET ORAL at 11:07

## 2023-07-22 RX ADMIN — IBUPROFEN 600 MG: 600 TABLET ORAL at 12:07

## 2023-07-22 RX ADMIN — PRENATAL VIT W/ FE FUMARATE-FA TAB 27-0.8 MG 1 TABLET: 27-0.8 TAB at 09:07

## 2023-07-22 RX ADMIN — HYDROCODONE BITARTRATE AND ACETAMINOPHEN 1 TABLET: 5; 325 TABLET ORAL at 12:07

## 2023-07-22 NOTE — DISCHARGE SUMMARY
Delivery Discharge Summary  Obstetrics      Primary OB Clinician: Maia Tello MD      Admission date: 2023  Discharge date: 2023    Disposition: To home, self care    Discharge Diagnosis List:      Patient Active Problem List   Diagnosis    Acne    BV (bacterial vaginosis)    Well woman exam    Contact with and (suspected) exposure to covid-19    Screening for other specific viral and chlamydial diseases     (spontaneous vaginal delivery)       Procedure:     Hospital Course:  Paz Bentley is a 28 y.o. now , PPD #2 who was admitted on 2023 at 39w2d for contractions. Patient was subsequently admitted to labor and delivery unit with signed consents.     Labor course was uncomplicated and resulted in  without complications.     Please see delivery note for further details.     Her postpartum course was complicated by the development of gestational hypertension. She remained asymptomatic, did not require medications, and her labs were unremarkable.     On discharge day, patient's pain is controlled with oral pain medications. Pt is tolerating ambulation without SOB or CP, and regular diet without N/V. Reports lochia is mild. Denies any HA, vision changes, F/C, LE swelling. Denies any breast pain/soreness.    Pt in stable condition and ready for discharge. She has been instructed to start and/or continue medications and follow up with her obstetrics provider as listed below.    On day of discharge  Heart: Regular rate and rhythm   Lung: Normal respiratory effort   Abdomen: Soft, appropriately tender, normoactive bowel sounds   Uterus: Firm, no fundal tenderness   Incision: N/A   : Deferred   Extremities: Bilateral trace edema       Pertinent studies:  CBC  Recent Labs   Lab 23  0851 23  0508   WBC 11.94 11.93   HGB 10.4* 9.8*   HCT 33.6* 30.6*   MCV 80* 78*    295        Immunization History   Administered Date(s) Administered    COVID-19, MRNA, LN-S, PF  (MODERNA FULL 0.5 ML DOSE) 03/06/2021    HPV 9-Valent 07/20/2017, 10/19/2017, 06/14/2018        Delivery:    Episiotomy: None   Lacerations:     Repair suture:     Repair # of packets: 1   Blood loss (ml):       Birth information:  YOB: 2021   Time of birth: 3:43 PM   Sex: male   Delivery type: Vaginal, Spontaneous   Gestational Age: 39w2d    Delivery Clinician:      Other providers:       Additional  information:  Forceps:    Vacuum:    Breech:    Observed anomalies      Living?:           APGARS  One minute Five minutes Ten minutes   Skin color:         Heart rate:         Grimace:         Muscle tone:         Breathing:         Totals: 9  9        Placenta: Delivered:       appearance    Patient Instructions:   Current Discharge Medication List        START taking these medications    Details   ibuprofen (ADVIL,MOTRIN) 800 MG tablet Take 1 tablet (800 mg total) by mouth 3 (three) times daily.  Qty: 30 tablet, Refills: 1      oxyCODONE-acetaminophen (PERCOCET) 5-325 mg per tablet Take 1 tablet by mouth every 4 (four) hours as needed for Pain.  Qty: 15 tablet, Refills: 0           CONTINUE these medications which have NOT CHANGED    Details   prenatal vit/iron fum/folic ac (RIGHT STEP PRENATAL VITAMINS ORAL) Take by mouth.             Discharge Procedure Orders   Diet Adult Regular     Pelvic Rest     Notify your health care provider if you experience any of the following:  temperature >100.4     Notify your health care provider if you experience any of the following:  persistent nausea and vomiting or diarrhea     Notify your health care provider if you experience any of the following:  severe uncontrolled pain     Notify your health care provider if you experience any of the following:  difficulty breathing or increased cough     Notify your health care provider if you experience any of the following:  severe persistent headache     Notify your health care provider if you experience any of the  following:  persistent dizziness, light-headedness, or visual disturbances     Activity as tolerated        Follow-up Information       Maia Tello MD Follow up in 1 week(s).    Specialty: Obstetrics and Gynecology  Why: 1 week for BP check and 6 weeks for routine postpartum visit  Contact information:  [id number]  [address] [address] [phone]

## 2023-07-22 NOTE — PLAN OF CARE
Based on pt's stated feeding goals, the Plan of care for today is for pt to do skin-to-skin, to feed frequently on demand, to observe for signs of effective milk transfer, to monitor voids and stools, and to call for assistance. Pt may breastfeed and/ or express breastmilk with hand expression, and provide EBM to baby with cup, or spoon, as needed.

## 2023-07-22 NOTE — PLAN OF CARE
Patient in no apparent distress. VSS. Ambulating without difficulty. No needs at this time. Discharge papers signed. Mother Baby care guide reviewed. All questions answered. Awaiting escort.

## 2023-07-22 NOTE — LACTATION NOTE
"Situation: continued lactation support, pt and baby discharging from hospital today     Background: day 2 postpartum, reports infant latching well but this AM has been very sleepy and reluctant to latch. Infant had circumcision this morning. Pt reports feeding plan is to exclusively pump, has two wearable pumps at home (unsure of brand).    Assessment:  State modulation to arouse baby, attempted at L side, after multiple attempts and position changes no latch achieved d/t infant sleepy. Assisted with HE, 5mL achieved and spoon fed.     LC noted small, scale like papules noted around outside corners of eyes bilaterally, spreading to temple area. No pus or erythema noted. Pt reports she has seen this since shortly after delivery but "it's a little darker or redder today." RN notified.     Actions:   1.  Reviewed latch technique, pt amenable to continuing to latch until pumping regularly and milk in  2. Educated on how to build/maintain milk supply with exclusive pumping, pump for 15-20 minutes at least 8 times every 24 hours  3. Educated on infant intake needs with pumped milk.  3.  Discharge teaching provided: Lactation discharge education completed. Plan of care is for pt to follow basic breastfeeding education, frequent feeding on demand, and to monitor baby's voids and stools. Breastfeeding guide, including First Alert survey, resource list, and lactation warmline phone number reviewed. Pt to notify doctor for maternal or infant concerns, as reviewed with [facility]WIN.      Results: Pt v/u of plan of care and questions answered.       07/22/23 1400   Maternal Assessment   Breast Shape pendulous   Breast Density soft   Areola elastic   Nipples everted   Maternal Infant Feeding   Maternal Emotional State assist needed   Breast Pumping   Breast Pumping hand expression utilized   Community Referrals   Community Referrals outpatient lactation program;support group       "

## 2023-07-22 NOTE — PROGRESS NOTES
POSTPARTUM PROGRESS NOTE    Subjective:     PPD#: 2   Procedure:    EGA: 39w2d   N/V: No   F/C: No   Abd Pain: Mild, well-controlled with oral pain medication   Lochia: Mild   Voiding: Yes   Ambulating: Yes   Bowel fnc: Yes   Breastfeeding: Yes   Contraception: Per primary OB   Circumcision: Consented.  Examined and approved by OB attending.   Patient denies headache, dizziness, visual changes, chest pain, shortness of breath, nausea or vomiting and right upper quadrant pain     Objective:      Temp:  [98 °F (36.7 °C)-98.4 °F (36.9 °C)] 98 °F (36.7 °C)  Pulse:  [65-98] 75  Resp:  [16-18] 18  SpO2:  [98 %-100 %] 98 %  BP: (117-128)/(75-92) 117/75    Heart: Regular rate and rhythm   Lung: Normal respiratory effort   Abdomen: Soft, appropriately tender   Uterus: Firm, no fundal tenderness   : Deferred   Extremities: Bilateral trace edema     Lab Review    Recent Labs   Lab 23  0638      K 3.5      CO2 20*   BUN 11   CREATININE 0.7   GLU 95   PROT 5.6*   BILITOT 0.2   ALKPHOS 98   ALT 30   AST 34       Recent Labs   Lab 23  0851 23  0508   WBC 11.94 11.93   HGB 10.4* 9.8*   HCT 33.6* 30.6*   MCV 80* 78*    295         I/O  No intake or output data in the 24 hours ending 23 1002     Assessment and Plan:   Paz Bentley is a 28 y.o. yo  s/p  PPD #2 c/b gHTN    gHTN\  -Asymptomatic  -Unmedicated  -Labs unremarkable  -No indication for Mg or antihypertensives at this time  -Pt to have 1 week BP check in office    Postpartum care  -Patient doing well and meeting appropriate milestones  -Pain controlled  -Ambulating, voiding, zachery PO  -Breastfeeding w/o issues  -Baby at bedside, doing well  Stable for d/c home with outpatient follow up    Erin Paulson MD  OB Hospitalist   Date and Time: 2023 10:02 AM

## 2023-07-24 ENCOUNTER — PATIENT MESSAGE (OUTPATIENT)
Dept: OBSTETRICS AND GYNECOLOGY | Facility: CLINIC | Age: 28
End: 2023-07-24
Payer: COMMERCIAL

## 2023-07-24 ENCOUNTER — PATIENT MESSAGE (OUTPATIENT)
Dept: OBSTETRICS AND GYNECOLOGY | Facility: OTHER | Age: 28
End: 2023-07-24
Payer: COMMERCIAL

## 2023-09-08 ENCOUNTER — PATIENT MESSAGE (OUTPATIENT)
Dept: OBSTETRICS AND GYNECOLOGY | Facility: CLINIC | Age: 28
End: 2023-09-08
Payer: MEDICAID

## 2023-09-08 ENCOUNTER — POSTPARTUM VISIT (OUTPATIENT)
Dept: OBSTETRICS AND GYNECOLOGY | Facility: CLINIC | Age: 28
End: 2023-09-08
Attending: STUDENT IN AN ORGANIZED HEALTH CARE EDUCATION/TRAINING PROGRAM
Payer: COMMERCIAL

## 2023-09-08 VITALS
WEIGHT: 126.13 LBS | SYSTOLIC BLOOD PRESSURE: 100 MMHG | DIASTOLIC BLOOD PRESSURE: 62 MMHG | BODY MASS INDEX: 25.43 KG/M2 | HEIGHT: 59 IN

## 2023-09-08 PROCEDURE — 0503F POSTPARTUM CARE VISIT: CPT | Mod: CPTII,S$GLB,, | Performed by: STUDENT IN AN ORGANIZED HEALTH CARE EDUCATION/TRAINING PROGRAM

## 2023-09-08 PROCEDURE — 0503F PR POSTPARTUM CARE VISIT: ICD-10-PCS | Mod: CPTII,S$GLB,, | Performed by: STUDENT IN AN ORGANIZED HEALTH CARE EDUCATION/TRAINING PROGRAM

## 2023-09-08 PROCEDURE — 99999 PR PBB SHADOW E&M-EST. PATIENT-LVL III: CPT | Mod: PBBFAC,,, | Performed by: STUDENT IN AN ORGANIZED HEALTH CARE EDUCATION/TRAINING PROGRAM

## 2023-09-08 PROCEDURE — 99999 PR PBB SHADOW E&M-EST. PATIENT-LVL III: ICD-10-PCS | Mod: PBBFAC,,, | Performed by: STUDENT IN AN ORGANIZED HEALTH CARE EDUCATION/TRAINING PROGRAM

## 2023-09-08 NOTE — PROGRESS NOTES
Subjective:      Paz Bentley is a 28 y.o.  who presents for a postpartum visit.  She is status post  uncomplicated vaginal delivery 6 weeks ago.  Her hospitalization was not complicated.  She is breastfeeding.  She desires no method for contraception.  She reports mood is stable.      History reviewed. No pertinent past medical history.    Current Outpatient Medications:     ibuprofen (ADVIL,MOTRIN) 800 MG tablet, Take 1 tablet (800 mg total) by mouth 3 (three) times daily., Disp: 30 tablet, Rfl: 1    prenatal vit/iron fum/folic ac (RIGHT STEP PRENATAL VITAMINS ORAL), Take by mouth., Disp: , Rfl:       Objective:     Vitals:    23 1336   BP: 100/62       APPEARANCE: Well nourished, well developed, in no acute distress.  PSYCH: Appropriate mood and affect.  NECK:  Supple, no thyromegaly.  ABDOMEN:  Soft, nontender.  GENITOURINARY:  External genitalia normal in appearance, normal perineal body, normal urethral meatus.  Vagina with scant discharge, no blood.  No lesions.  Cervix without lesion, C/T/H.  Uterus mobile, nontender, normal in size.  Adnexa without masses or TTP.    EXTREMITIES: No edema.      Assessment:     Postpartum care and examination        Plan:     1. Postpartum course reviewed and discussed with patient.  All questions answered.  Return to clinic in 3 months for annual exam

## 2023-10-13 ENCOUNTER — PATIENT MESSAGE (OUTPATIENT)
Dept: OBSTETRICS AND GYNECOLOGY | Facility: CLINIC | Age: 28
End: 2023-10-13
Payer: MEDICAID

## 2023-11-02 ENCOUNTER — TELEPHONE (OUTPATIENT)
Dept: OBSTETRICS AND GYNECOLOGY | Facility: CLINIC | Age: 28
End: 2023-11-02
Payer: MEDICAID

## 2023-11-02 NOTE — TELEPHONE ENCOUNTER
"----- Message from Maia Tello MD sent at 11/2/2023  2:55 PM CDT -----  Regarding: RE: letter to return to work  Yes that is perfect!  Please set up.  Thank you  ----- Message -----  From: Leigha Pandey MA  Sent: 11/2/2023   2:38 PM CDT  To: Maia Tello MD; #  Subject: letter to return to work                         Hi Dr Tello,     Patient needs a letter for work. Here is a sample of the letter, if appropriate please compose letter and placed to patient portal    "Paz Bentley was under my care for pregnancy and delivery.  She delivered on 07/20/2023 and was medically cleared to return to work as of 08/31/2023.    Leigha Pandey  HIM   [facility]  Tele: [phone]  Fax: [phone]  Email: [email or web address]        "

## 2023-11-02 NOTE — LETTER
"  November 2, 2023      [facility]  [address] [facility]  [place] [address]  Phone: [phone]       Patient: Paz Bentley   YOB: 1995  Date of Visit: 11/02/2023    To Whom It May Concern:       "Paz Bentley was under my care for her Pregnancy and Delivery.  She Delivered on 07/20/2023 and was medically cleared and to  return to work as of 08/31/2023.      Sincerely,           Maia Campoverde MD      "

## 2023-11-02 NOTE — TELEPHONE ENCOUNTER
"----- Message from Maia Tello MD sent at 11/2/2023  4:03 PM CDT -----  Regarding: FW: letter to return to work  Not sure if this already got sent.  She needs a return to work letter per Disability:  Patient needs a letter for work. Here is a sample of the letter, if appropriate please compose letter and placed to patient portal     "Paz Bentley was under my care for pregnancy and delivery.  She delivered on 07/20/2023 and was medically cleared to return to work as of 08/31/2023. "  Thank you!    ----- Message -----  From: Leigha Pandey MA  Sent: 11/2/2023   2:38 PM CDT  To: Maia Tello MD; #  Subject: letter to return to work                         Hi Dr Tello,     Patient needs a letter for work. Here is a sample of the letter, if appropriate please compose letter and placed to patient portal    "Paz Bentley was under my care for pregnancy and delivery.  She delivered on 07/20/2023 and was medically cleared to return to work as of 08/31/2023.    Leigha Pandey  HIM   [facility]  Tele: [phone]  Fax: [phone]  Email: [email or web address]        "

## 2023-11-03 NOTE — TELEPHONE ENCOUNTER
"----- Message from Leigha Pandey MA sent at 11/2/2023  5:12 PM CDT -----  Regarding: RE: letter to return to work  Lelia Rowan    So this verbage should be sufficient for the letter "Paz Bentley was under my care for pregnancy and delivery.  She delivered on 07/20/2023 and was medically cleared to return to work as of 08/31/2023."      ----- Message -----  From: Maia Tello MD  Sent: 11/2/2023   2:55 PM CDT  To: Leigha Pandey MA; [facility] Staff  Subject: RE: letter to return to work                     Yes that is perfect!  Please set up.  Thank you  ----- Message -----  From: Leigha Pandey MA  Sent: 11/2/2023   2:38 PM CDT  To: Maia Tello MD; #  Subject: letter to return to work                         Hi Dr Tello,     Patient needs a letter for work. Here is a sample of the letter, if appropriate please compose letter and placed to patient portal    "Paz Bentley was under my care for pregnancy and delivery.  She delivered on 07/20/2023 and was medically cleared to return to work as of 08/31/2023.    Leigha Pandey  HIM   [facility]  Tele: [phone]  Fax: [phone]  Email: [email or web address]          "

## 2024-05-13 ENCOUNTER — PATIENT MESSAGE (OUTPATIENT)
Dept: OBSTETRICS AND GYNECOLOGY | Facility: CLINIC | Age: 29
End: 2024-05-13
Payer: MEDICAID

## 2024-05-15 ENCOUNTER — OFFICE VISIT (OUTPATIENT)
Dept: OBSTETRICS AND GYNECOLOGY | Facility: CLINIC | Age: 29
End: 2024-05-15
Payer: COMMERCIAL

## 2024-05-15 VITALS
BODY MASS INDEX: 26.22 KG/M2 | SYSTOLIC BLOOD PRESSURE: 100 MMHG | HEIGHT: 59 IN | DIASTOLIC BLOOD PRESSURE: 64 MMHG | WEIGHT: 130.06 LBS

## 2024-05-15 DIAGNOSIS — Z12.4 CERVICAL CANCER SCREENING: ICD-10-CM

## 2024-05-15 DIAGNOSIS — Z01.419 ENCOUNTER FOR GYNECOLOGICAL EXAMINATION: Primary | ICD-10-CM

## 2024-05-15 DIAGNOSIS — R10.2 PELVIC PAIN: ICD-10-CM

## 2024-05-15 LAB
B-HCG UR QL: NEGATIVE
BILIRUB SERPL-MCNC: NORMAL MG/DL
BLOOD URINE, POC: NORMAL
CLARITY, POC UA: CLEAR
COLOR, POC UA: YELLOW
CTP QC/QA: YES
GLUCOSE UR QL STRIP: NORMAL
KETONES UR QL STRIP: NORMAL
LEUKOCYTE ESTERASE URINE, POC: NORMAL
NITRITE, POC UA: NORMAL
PH, POC UA: 5
PROTEIN, POC: NORMAL
SPECIFIC GRAVITY, POC UA: 1015
UROBILINOGEN, POC UA: NORMAL

## 2024-05-15 PROCEDURE — 1159F MED LIST DOCD IN RCRD: CPT | Mod: CPTII,S$GLB,, | Performed by: STUDENT IN AN ORGANIZED HEALTH CARE EDUCATION/TRAINING PROGRAM

## 2024-05-15 PROCEDURE — 81025 URINE PREGNANCY TEST: CPT | Mod: S$GLB,,, | Performed by: STUDENT IN AN ORGANIZED HEALTH CARE EDUCATION/TRAINING PROGRAM

## 2024-05-15 PROCEDURE — 3008F BODY MASS INDEX DOCD: CPT | Mod: CPTII,S$GLB,, | Performed by: STUDENT IN AN ORGANIZED HEALTH CARE EDUCATION/TRAINING PROGRAM

## 2024-05-15 PROCEDURE — 88175 CYTOPATH C/V AUTO FLUID REDO: CPT | Performed by: STUDENT IN AN ORGANIZED HEALTH CARE EDUCATION/TRAINING PROGRAM

## 2024-05-15 PROCEDURE — 99999 PR PBB SHADOW E&M-EST. PATIENT-LVL III: CPT | Mod: PBBFAC,,, | Performed by: STUDENT IN AN ORGANIZED HEALTH CARE EDUCATION/TRAINING PROGRAM

## 2024-05-15 PROCEDURE — 81002 URINALYSIS NONAUTO W/O SCOPE: CPT | Mod: S$GLB,,, | Performed by: STUDENT IN AN ORGANIZED HEALTH CARE EDUCATION/TRAINING PROGRAM

## 2024-05-15 PROCEDURE — 99395 PREV VISIT EST AGE 18-39: CPT | Mod: S$GLB,,, | Performed by: STUDENT IN AN ORGANIZED HEALTH CARE EDUCATION/TRAINING PROGRAM

## 2024-05-15 PROCEDURE — 3078F DIAST BP <80 MM HG: CPT | Mod: CPTII,S$GLB,, | Performed by: STUDENT IN AN ORGANIZED HEALTH CARE EDUCATION/TRAINING PROGRAM

## 2024-05-15 PROCEDURE — 1160F RVW MEDS BY RX/DR IN RCRD: CPT | Mod: CPTII,S$GLB,, | Performed by: STUDENT IN AN ORGANIZED HEALTH CARE EDUCATION/TRAINING PROGRAM

## 2024-05-15 PROCEDURE — 3074F SYST BP LT 130 MM HG: CPT | Mod: CPTII,S$GLB,, | Performed by: STUDENT IN AN ORGANIZED HEALTH CARE EDUCATION/TRAINING PROGRAM

## 2024-05-15 NOTE — PROGRESS NOTES
Chief Complaint: Well Woman Exam     HPI:      Paz Bentley is a 29 y.o.  who presents today for well woman exam. Not having a cycle with breastfeeding.  Today patient GYN complaints include: left-sided pelvic pain cramping in nature, comes and goes, denies dysuria or hematuria. Reports clear normal appearing vaginal discharge. Denies diarrhea/constipation.  Specifically, patient denies abnormal vaginal bleeding,  pelvic pain, urinary problems.    Ms. Bentley is currently sexually active with a single male partner. She is currently using condoms for contraception. She declines STD screening today.    Previous Pap: NILM (2018)    Past Medical History:   Diagnosis Date    Diabetes mellitus        History reviewed. No pertinent surgical history.    Social History     Socioeconomic History    Marital status: Single   Tobacco Use    Smoking status: Never    Smokeless tobacco: Never   Substance and Sexual Activity    Alcohol use: Not Currently    Drug use: No    Sexual activity: Yes     Partners: Male     Birth control/protection: None     Social Determinants of Health     Financial Resource Strain: Medium Risk (2022)    Overall Financial Resource Strain (CARDIA)     Difficulty of Paying Living Expenses: Somewhat hard   Food Insecurity: No Food Insecurity (2022)    Hunger Vital Sign     Worried About Running Out of Food in the Last Year: Never true     Ran Out of Food in the Last Year: Never true   Transportation Needs: No Transportation Needs (2022)    PRAPARE - Transportation     Lack of Transportation (Medical): No     Lack of Transportation (Non-Medical): No   Stress: No Stress Concern Present (2022)    [employer] [place] [employer] - Occupational Stress Questionnaire     Feeling of Stress : Not at all   Housing Stability: Unknown (2022)    Housing Stability Vital Sign     Unable to Pay for Housing in the Last Year: No     Unstable Housing in the Last Year: No       Family  "History   Problem Relation Name Age of Onset    Breast cancer Maternal Grandmother      Colon cancer Neg Hx      Ovarian cancer Neg Hx         Review of patient's allergies indicates:  No Known Allergies    OB History          2    Para   1    Term   1       0    AB   1    Living   1         SAB   0    IAB   0    Ectopic   0    Multiple   0    Live Births   1           Obstetric Comments   Menarche at 14               Physical Exam:      PHYSICAL EXAM:  /64   Ht 4' 11" (1.499 m)   Wt 59 kg (130 lb 1.1 oz)   Breastfeeding Yes   BMI 26.27 kg/m²   Body mass index is 26.27 kg/m².     APPEARANCE: Well nourished, well developed, in no acute distress.  PSYCH: Appropriate mood and affect.  SKIN: No acne or hirsutism  NECK: Neck symmetric without masses  NODES: No inguinal, axillary, or supraclavicular lymph node enlargement  ABDOMEN: Soft.  No tenderness or masses.    CARDIOVASCULAR: No edema of peripheral extremities  BREASTS: Symmetrical, no visible skin lesions. Lumpy bumpy feel to bilateral breasts. No nipple discharge bilaterally.  PELVIC: Normal external genitalia without lesions.  Normal hair distribution.  Adequate perineal body, normal urethral meatus.  Vagina moist and well rugated. Without lesions. With physiologic discharge.  Cervix pink, without lesions, discharge or tenderness.  No significant cystocele or rectocele.  Bimanual exam shows uterus to be normal size, regular, mobile and nontender.  Adnexa without masses or tenderness.      Assessment/Plan:     Encounter for gynecological examination    Cervical cancer screening  -     Liquid-Based Pap Smear, Screening    Pelvic pain  -     POCT urine pregnancy  -     POCT urine dipstick without microscope    - pelvic exam normal  - pap collected  - UPT neg, urine dip WNL  - discussed possible reasons for pelvic cramping, recommend monitoring - to inform us if persisting and will plan to obtain pelvic US  - RTC 1 yr or sooner " PRN    Counseling:     Patient was counseled today on current ASCCP pap guidelines, the recommendation for yearly physical exams, safe driving habits, breast self awareness. She is to see her PCP for other health maintenance.     Use of the [employer] Patient Portal discussed and encouraged during today's visit.

## 2024-06-14 NOTE — TELEPHONE ENCOUNTER
Detail Level: Zone I[facility] pt, was calling to request MD to give her a call please in regards of her lab and pap results. Please call pt and advise, thank you.    Detail Level: Generalized

## 2024-07-16 ENCOUNTER — E-VISIT (OUTPATIENT)
Dept: OBSTETRICS AND GYNECOLOGY | Facility: CLINIC | Age: 29
End: 2024-07-16
Payer: COMMERCIAL

## 2024-07-16 DIAGNOSIS — B37.31 YEAST VAGINITIS: Primary | ICD-10-CM

## 2024-07-16 RX ORDER — FLUCONAZOLE 150 MG/1
TABLET ORAL
Qty: 2 TABLET | Refills: 0 | Status: SHIPPED | OUTPATIENT
Start: 2024-07-16

## 2024-07-16 NOTE — PROGRESS NOTES
Patient ID: Paz Bentley is a 29 y.o. female.    Chief Complaint: Vaginal Discharge (Entered automatically based on patient selection in [employer].)    The patient initiated a request through [employer] on 7/16/2024 for evaluation and management with a chief complaint of Vaginal Discharge (Entered automatically based on patient selection in [employer].)     I evaluated the questionnaire submission on 7/16/24.    Oh[facility] Peq Evisit Vaginal Discharge    7/16/2024 11:49 AM CDT - Filed by Patient   Do you agree to participate in an E-Visit? Yes   If you have any of the following symptoms,  please do not complete an E-Visit,  schedule an appointment with your provider: I acknowledge   Are you pregnant, could you be pregnant, or are you breast feeding? None of the above   What is the main issue you would like addressed today? yeast infection   Which of the following are you experiencing? Vaginal Itching    Are you having pain while passing urine? No, I have no pain while urinating.   Which of the following applies to your vaginal discharge? I have a white/milky discharge.    Which of the following are you experiencing? None of the above   Do you have any sores on your genitals? No    Have you taken antibiotics recently? I have not been on any antibiotics    Do you use any of the following? None of the above   Which of the following applies to your menstrual period? I had a menstrual period in the last 2 weeks.   Have you had similar symptoms in the past? Yes, I have had similar symptoms once before.   When you had similar symptoms in the past, did any of the following work? Pills for yeast infection   Have you had a fever? No   During the last 2 months, have you had sexual contact with a specific person for the first time? No   Provide any additional information you feel is important.    Please attach any relevant images or files    Are you able to take your vital signs? No         Encounter Diagnosis   Name Primary?    Yeast  vaginitis Yes        No orders of the defined types were placed in this encounter.     Medications Ordered This Encounter   Medications    fluconazole (DIFLUCAN) 150 MG Tab     Si pod QD and if not better in 3 days take a second pill     Dispense:  2 tablet     Refill:  0        No follow-ups on file.      E-Visit Time Tracking:    Day 1 Time (in minutes): 5    Total Time (in minutes): 5

## 2024-09-09 ENCOUNTER — PATIENT MESSAGE (OUTPATIENT)
Dept: OBSTETRICS AND GYNECOLOGY | Facility: CLINIC | Age: 29
End: 2024-09-09
Payer: COMMERCIAL

## 2024-09-09 RX ORDER — METRONIDAZOLE 375 MG/1
375 CAPSULE ORAL 2 TIMES DAILY
COMMUNITY
End: 2024-09-09 | Stop reason: SDUPTHER

## 2024-09-09 RX ORDER — METRONIDAZOLE 375 MG/1
375 CAPSULE ORAL 2 TIMES DAILY
Qty: 14 CAPSULE | Refills: 0 | Status: SHIPPED | OUTPATIENT
Start: 2024-09-09 | End: 2024-09-16

## 2025-05-13 ENCOUNTER — TELEPHONE (OUTPATIENT)
Dept: OBSTETRICS AND GYNECOLOGY | Facility: CLINIC | Age: 30
End: 2025-05-13
Payer: COMMERCIAL

## 2025-05-13 DIAGNOSIS — N83.209 CYST OF OVARY, UNSPECIFIED LATERALITY: Primary | ICD-10-CM

## 2025-05-13 NOTE — TELEPHONE ENCOUNTER
Arcadio Yost pt calling, states she went to ED last night and was told the pain she has in her stomach is due to her ovarian cyst. Would like to discuss     5/13/25 @ 1198 Returned pt's call. Pt states she went to the ER last night for pelvic pain, She was dx'd with an ovarian cyst. She was given tramadol. States it is very painful. Will speak with Dr Tello and follow up with pt. Pt miguel ángel       @ [phone] Called pt to schedule visit and ultrasound this coming Monday with Dr Tello. NA [facility]    @[phone] Pt returned call. Scheduled for Monday 5/19 at 3:00 for an us and to see Dr Tello at the [facility] location. Pt miguel ángel

## 2025-05-19 ENCOUNTER — LAB VISIT (OUTPATIENT)
Dept: LAB | Facility: OTHER | Age: 30
End: 2025-05-19
Attending: STUDENT IN AN ORGANIZED HEALTH CARE EDUCATION/TRAINING PROGRAM
Payer: COMMERCIAL

## 2025-05-19 ENCOUNTER — OFFICE VISIT (OUTPATIENT)
Dept: OBSTETRICS AND GYNECOLOGY | Facility: CLINIC | Age: 30
End: 2025-05-19
Attending: STUDENT IN AN ORGANIZED HEALTH CARE EDUCATION/TRAINING PROGRAM
Payer: COMMERCIAL

## 2025-05-19 VITALS — DIASTOLIC BLOOD PRESSURE: 76 MMHG | SYSTOLIC BLOOD PRESSURE: 100 MMHG

## 2025-05-19 DIAGNOSIS — B37.31 YEAST VAGINITIS: Primary | ICD-10-CM

## 2025-05-19 DIAGNOSIS — Z11.3 SCREENING FOR STDS (SEXUALLY TRANSMITTED DISEASES): ICD-10-CM

## 2025-05-19 LAB
HBV SURFACE AG SERPL QL IA: NORMAL
HCV AB SERPL QL IA: NEGATIVE
HIV 1+2 AB+HIV1 P24 AG SERPL QL IA: NEGATIVE
T PALLIDUM IGG+IGM SER QL: NEGATIVE

## 2025-05-19 PROCEDURE — 99999 PR PBB SHADOW E&M-EST. PATIENT-LVL II: CPT | Mod: PBBFAC,,, | Performed by: STUDENT IN AN ORGANIZED HEALTH CARE EDUCATION/TRAINING PROGRAM

## 2025-05-19 PROCEDURE — 3074F SYST BP LT 130 MM HG: CPT | Mod: CPTII,S$GLB,, | Performed by: STUDENT IN AN ORGANIZED HEALTH CARE EDUCATION/TRAINING PROGRAM

## 2025-05-19 PROCEDURE — 3078F DIAST BP <80 MM HG: CPT | Mod: CPTII,S$GLB,, | Performed by: STUDENT IN AN ORGANIZED HEALTH CARE EDUCATION/TRAINING PROGRAM

## 2025-05-19 PROCEDURE — 86593 SYPHILIS TEST NON-TREP QUANT: CPT

## 2025-05-19 PROCEDURE — 36415 COLL VENOUS BLD VENIPUNCTURE: CPT

## 2025-05-19 PROCEDURE — 87340 HEPATITIS B SURFACE AG IA: CPT

## 2025-05-19 PROCEDURE — 99214 OFFICE O/P EST MOD 30 MIN: CPT | Mod: S$GLB,,, | Performed by: STUDENT IN AN ORGANIZED HEALTH CARE EDUCATION/TRAINING PROGRAM

## 2025-05-19 PROCEDURE — 86803 HEPATITIS C AB TEST: CPT

## 2025-05-19 PROCEDURE — 87591 N.GONORRHOEAE DNA AMP PROB: CPT | Performed by: STUDENT IN AN ORGANIZED HEALTH CARE EDUCATION/TRAINING PROGRAM

## 2025-05-19 PROCEDURE — 81515 NFCT DS BV&VAGINITIS DNA ALG: CPT | Performed by: STUDENT IN AN ORGANIZED HEALTH CARE EDUCATION/TRAINING PROGRAM

## 2025-05-19 PROCEDURE — 87389 HIV-1 AG W/HIV-1&-2 AB AG IA: CPT

## 2025-05-19 PROCEDURE — 1159F MED LIST DOCD IN RCRD: CPT | Mod: CPTII,S$GLB,, | Performed by: STUDENT IN AN ORGANIZED HEALTH CARE EDUCATION/TRAINING PROGRAM

## 2025-05-19 NOTE — PROGRESS NOTES
Chief Complaint: U/S Results     HPI:      Paz Bentley is a 30 y.o.  who presents today for follow up of U/S results.  Episode of pelvic pain and cramping that brought her into ED.  Had just finished menses and then had diarrhea.  Then pain started.  Eval in ED showed small ovarian cyst.  Pain has now resolved.  Has not recurred.  This was the first time it had happened.  Also some vaginal and vulvar irritation for the past 2 weeks.  Some discharge.  No odor.  LMP: Patient's last menstrual period was 2025 (approximate).  No other issues, problems, or complaints.     OB History          2    Para   1    Term   1       0    AB   1    Living   1         SAB   0    IAB   0    Ectopic   0    Multiple   0    Live Births   1           Obstetric Comments   Menarche at 14             Past Medical History:   Diagnosis Date    Diabetes mellitus      History reviewed. No pertinent surgical history.  Social History[1]  Family History   Problem Relation Name Age of Onset    Breast cancer Maternal Grandmother      Colon cancer Neg Hx      Ovarian cancer Neg Hx       Current Medications[2]    ROS:     GENERAL: Denies unintentional weight gain or weight loss. Feeling well overall.   SKIN: Denies rash or lesions.   HEENT: Denies headaches, or vision changes.   ABDOMEN: Denies abdominal pain, constipation, diarrhea, nausea, vomiting, change in appetite.  URINARY: Denies frequency, dysuria, hematuria.  NEUROLOGIC: Denies syncope or weakness.   PSYCHIATRIC: Denies depression, anxiety or mood swings.    Physical Exam:      PHYSICAL EXAM:  /76   LMP 2025 (Approximate)   Breastfeeding No   There is no height or weight on file to calculate BMI.     APPEARANCE: Well nourished, well developed, in no acute distress.  PSYCH: Appropriate mood and affect.  SKIN: No acne or hirsutism.  :  Clumpy white discharge, vagina withotu lesion, cervix without lesion, uterus and adnexa mobile and  nontender  Assessment/Plan:     30 y.o.     Yeast vaginitis  -     Vaginosis Screen by DNA Probe; Future; Expected date: 2025    Screening for STDs (sexually transmitted diseases)  -     C. trachomatis/N. gonorrhoeae by AMP DNA  -     HIV 1/2 Ag/Ab (4th Gen); Future; Expected date: 2025  -     Treponema Pallidium Antibodies IgG, IgM; Future; Expected date: 2025  -     Hepatitis B Surface Antigen; Future; Expected date: 2025  -     Hepatitis C Antibody; Future; Expected date: 2025    Other orders  -     fluconazole (DIFLUCAN) 150 MG Tab; Take 1 tablet (150 mg total) by mouth once. Take one tablet. If symptoms persist, repeat dose in 3 days. for 1 dose  Dispense: 2 tablet; Refill: 1  -     clotrimazole-betamethasone 1-0.05% (LOTRISONE) cream; Apply topically 2 (two) times daily.  Dispense: 45 g; Refill: 0          Counselin.  Pelvic pain:  U/S results reviewed with patient.  Reviewed multiple etiologies.  She will continue to monitor and let us know if symptoms recur.  2.  Vulvar candidiasis:  Meds to pharmacy.  Swab sent and will follow up results.  Patient was counseled today on vulvar and perineal care:    -Avoid feminine products such as deodorant soaps, body wash, bubble bath, douches, scented toilet paper, deodorant tampons or pads, feminine wipes, chronic pad use, etc.  -Avoid other vulvovaginal irritants such as long hot baths, humidity, tight, synthetic clothing, chlorine and sitting around in wet bathing suits  -Wear cotton underwear.  -Shower immediately after exercise and change clothes  -She was encouraged not to douche  STD screening ordered.   3.  Follow up with PCP for other health maintenance.  .4 RTC for annual exam or sooner if needed.    Use of the [facility] Patient Portal discussed and encouraged during today's visit.                  [1]   Social History  Socioeconomic History    Marital status: Single   Tobacco Use    Smoking status: Never    Smokeless  tobacco: Never   Substance and Sexual Activity    Alcohol use: Not Currently    Drug use: No    Sexual activity: Yes     Partners: Male     Birth control/protection: None     Social Drivers of Health     Financial Resource Strain: Medium Risk (12/9/2022)    Overall Financial Resource Strain (CARDIA)     Difficulty of Paying Living Expenses: Somewhat hard   Food Insecurity: No Food Insecurity (12/9/2022)    Hunger Vital Sign     Worried About Running Out of Food in the Last Year: Never true     Ran Out of Food in the Last Year: Never true   Transportation Needs: No Transportation Needs (12/9/2022)    PRAPARE - Transportation     Lack of Transportation (Medical): No     Lack of Transportation (Non-Medical): No   Stress: No Stress Concern Present (12/9/2022)    [employer] [place] [employer] - Occupational Stress Questionnaire     Feeling of Stress : Not at all   Housing Stability: Unknown (12/9/2022)    Housing Stability Vital Sign     Unable to Pay for Housing in the Last Year: No     Unstable Housing in the Last Year: No   [2]   Current Outpatient Medications:     clotrimazole-betamethasone 1-0.05% (LOTRISONE) cream, Apply topically 2 (two) times daily., Disp: 45 g, Rfl: 0    fluconazole (DIFLUCAN) 150 MG Tab, 1 pod QD and if not better in 3 days take a second pill (Patient not taking: Reported on 5/19/2025), Disp: 2 tablet, Rfl: 0    fluconazole (DIFLUCAN) 150 MG Tab, Take 1 tablet (150 mg total) by mouth once. Take one tablet. If symptoms persist, repeat dose in 3 days. for 1 dose, Disp: 2 tablet, Rfl: 1    ibuprofen (ADVIL,MOTRIN) 800 MG tablet, Take 1 tablet (800 mg total) by mouth 3 (three) times daily. (Patient not taking: Reported on 5/19/2025), Disp: 30 tablet, Rfl: 1    prenatal vit/iron fum/folic ac (RIGHT STEP PRENATAL VITAMINS ORAL), Take by mouth. (Patient not taking: Reported on 5/19/2025), Disp: , Rfl:

## 2025-05-20 ENCOUNTER — RESULTS FOLLOW-UP (OUTPATIENT)
Dept: OBSTETRICS AND GYNECOLOGY | Facility: HOSPITAL | Age: 30
End: 2025-05-20

## 2025-05-20 RX ORDER — CLOTRIMAZOLE AND BETAMETHASONE DIPROPIONATE 10; .64 MG/G; MG/G
CREAM TOPICAL 2 TIMES DAILY
Qty: 45 G | Refills: 0 | Status: SHIPPED | OUTPATIENT
Start: 2025-05-20

## 2025-05-20 RX ORDER — FLUCONAZOLE 150 MG/1
150 TABLET ORAL ONCE
Qty: 2 TABLET | Refills: 1 | Status: SHIPPED | OUTPATIENT
Start: 2025-05-20 | End: 2025-05-20

## 2025-05-22 LAB
BACTERIAL VAGINOSIS DNA (OHS): NOT DETECTED
C TRACH DNA SPEC QL NAA+PROBE: NOT DETECTED
CANDIDA GLABRATA/KRUSEI DNA (OHS): NOT DETECTED
CANDIDA SPECIES DNA (OHS): DETECTED
CTGC SOURCE (OHS) ORD-325: NORMAL
N GONORRHOEA DNA UR QL NAA+PROBE: NOT DETECTED
TRICHOMONAS VAGINALIS DNA (OHS): NOT DETECTED